# Patient Record
Sex: FEMALE | Race: WHITE | Employment: OTHER | URBAN - METROPOLITAN AREA
[De-identification: names, ages, dates, MRNs, and addresses within clinical notes are randomized per-mention and may not be internally consistent; named-entity substitution may affect disease eponyms.]

---

## 2017-07-19 ENCOUNTER — HOSPITAL ENCOUNTER (OUTPATIENT)
Dept: NON INVASIVE DIAGNOSTICS | Age: 82
Discharge: HOME | End: 2017-07-19
Attending: FAMILY MEDICINE | Admitting: FAMILY MEDICINE

## 2017-07-19 DIAGNOSIS — I35.0 NONRHEUMATIC AORTIC VALVE STENOSIS: ICD-10-CM

## 2017-07-19 LAB
LV EF: 55 %
LVEF MODALITY: NORMAL

## 2017-09-25 ENCOUNTER — OFFICE VISIT (OUTPATIENT)
Dept: ORTHOPEDIC SURGERY | Age: 82
End: 2017-09-25

## 2017-09-25 VITALS
HEIGHT: 60 IN | SYSTOLIC BLOOD PRESSURE: 144 MMHG | DIASTOLIC BLOOD PRESSURE: 77 MMHG | WEIGHT: 117 LBS | BODY MASS INDEX: 22.97 KG/M2

## 2017-09-25 DIAGNOSIS — G89.29 CHRONIC RIGHT-SIDED LOW BACK PAIN WITH RIGHT-SIDED SCIATICA: Primary | ICD-10-CM

## 2017-09-25 DIAGNOSIS — M54.41 CHRONIC RIGHT-SIDED LOW BACK PAIN WITH RIGHT-SIDED SCIATICA: Primary | ICD-10-CM

## 2017-09-25 PROCEDURE — E0135 WALKER FOLDING ADJUST/FIXED: HCPCS | Performed by: ORTHOPAEDIC SURGERY

## 2017-09-25 PROCEDURE — 1036F TOBACCO NON-USER: CPT | Performed by: ORTHOPAEDIC SURGERY

## 2017-09-25 PROCEDURE — 1090F PRES/ABSN URINE INCON ASSESS: CPT | Performed by: ORTHOPAEDIC SURGERY

## 2017-09-25 PROCEDURE — G8427 DOCREV CUR MEDS BY ELIG CLIN: HCPCS | Performed by: ORTHOPAEDIC SURGERY

## 2017-09-25 PROCEDURE — G8420 CALC BMI NORM PARAMETERS: HCPCS | Performed by: ORTHOPAEDIC SURGERY

## 2017-09-25 PROCEDURE — 4040F PNEUMOC VAC/ADMIN/RCVD: CPT | Performed by: ORTHOPAEDIC SURGERY

## 2017-09-25 PROCEDURE — 72100 X-RAY EXAM L-S SPINE 2/3 VWS: CPT | Performed by: ORTHOPAEDIC SURGERY

## 2017-09-25 PROCEDURE — 1123F ACP DISCUSS/DSCN MKR DOCD: CPT | Performed by: ORTHOPAEDIC SURGERY

## 2017-09-25 PROCEDURE — 99204 OFFICE O/P NEW MOD 45 MIN: CPT | Performed by: ORTHOPAEDIC SURGERY

## 2017-09-25 RX ORDER — PREDNISONE 20 MG/1
TABLET ORAL
COMMUNITY
Start: 2017-07-07 | End: 2018-06-05

## 2017-09-25 RX ORDER — ALBUTEROL SULFATE 90 MCG
HFA AEROSOL WITH ADAPTER (GRAM) INHALATION
COMMUNITY
Start: 2017-07-02 | End: 2018-06-05 | Stop reason: SDUPTHER

## 2017-09-25 RX ORDER — METHYLPREDNISOLONE 4 MG
TABLET, DOSE PACK ORAL
Qty: 1 KIT | Refills: 0 | Status: SHIPPED | OUTPATIENT
Start: 2017-09-25 | End: 2017-10-01

## 2017-10-03 ENCOUNTER — HOSPITAL ENCOUNTER (OUTPATIENT)
Dept: PHYSICAL THERAPY | Age: 82
Discharge: HOME | End: 2017-10-04
Admitting: ORTHOPAEDIC SURGERY

## 2017-10-03 ENCOUNTER — HOSPITAL ENCOUNTER (OUTPATIENT)
Dept: PHYSICAL THERAPY | Age: 82
Discharge: HOME | End: 2017-09-30
Admitting: ORTHOPAEDIC SURGERY

## 2017-10-10 ENCOUNTER — HOSPITAL ENCOUNTER (OUTPATIENT)
Dept: PHYSICAL THERAPY | Age: 82
Discharge: HOME | End: 2017-10-11
Admitting: ORTHOPAEDIC SURGERY

## 2017-10-17 ENCOUNTER — HOSPITAL ENCOUNTER (OUTPATIENT)
Dept: PHYSICAL THERAPY | Age: 82
Discharge: HOME | End: 2017-10-18
Admitting: ORTHOPAEDIC SURGERY

## 2017-10-24 ENCOUNTER — HOSPITAL ENCOUNTER (OUTPATIENT)
Dept: PHYSICAL THERAPY | Age: 82
Discharge: HOME | End: 2017-10-25
Admitting: ORTHOPAEDIC SURGERY

## 2017-11-01 ENCOUNTER — HOSPITAL ENCOUNTER (OUTPATIENT)
Dept: PHYSICAL THERAPY | Age: 82
Discharge: HOME | End: 2017-11-30
Attending: ORTHOPAEDIC SURGERY | Admitting: ORTHOPAEDIC SURGERY

## 2018-01-12 RX ORDER — METHYLPREDNISOLONE 4 MG/1
TABLET ORAL
Qty: 1 KIT | Refills: 0 | Status: SHIPPED | OUTPATIENT
Start: 2018-01-12 | End: 2018-06-05

## 2018-02-27 ENCOUNTER — TELEPHONE (OUTPATIENT)
Dept: ORTHOPEDIC SURGERY | Age: 83
End: 2018-02-27

## 2018-04-16 VITALS
TEMPERATURE: 97.6 F | BODY MASS INDEX: 21.14 KG/M2 | DIASTOLIC BLOOD PRESSURE: 70 MMHG | SYSTOLIC BLOOD PRESSURE: 108 MMHG | HEART RATE: 80 BPM | RESPIRATION RATE: 15 BRPM | HEIGHT: 61 IN | WEIGHT: 112 LBS

## 2018-05-22 ENCOUNTER — HOSPITAL ENCOUNTER (OUTPATIENT)
Dept: NON INVASIVE DIAGNOSTICS | Age: 83
Discharge: HOME | End: 2018-05-22
Attending: FAMILY MEDICINE | Admitting: FAMILY MEDICINE

## 2018-05-22 DIAGNOSIS — I50.1 LEFT VENTRICULAR FAILURE (HCC): ICD-10-CM

## 2018-05-22 LAB
LV EF: 58 %
LVEF MODALITY: NORMAL

## 2018-06-05 ENCOUNTER — OFFICE VISIT (OUTPATIENT)
Dept: PRIMARY CARE CLINIC | Age: 83
End: 2018-06-05

## 2018-06-05 VITALS
SYSTOLIC BLOOD PRESSURE: 122 MMHG | WEIGHT: 107 LBS | BODY MASS INDEX: 20.2 KG/M2 | HEART RATE: 80 BPM | RESPIRATION RATE: 16 BRPM | DIASTOLIC BLOOD PRESSURE: 74 MMHG | HEIGHT: 61 IN | TEMPERATURE: 97.6 F

## 2018-06-05 DIAGNOSIS — M16.0 OSTEOARTHRITIS OF BOTH HIPS, UNSPECIFIED OSTEOARTHRITIS TYPE: ICD-10-CM

## 2018-06-05 DIAGNOSIS — J45.20 MILD INTERMITTENT ASTHMA, UNSPECIFIED WHETHER COMPLICATED: Primary | ICD-10-CM

## 2018-06-05 DIAGNOSIS — I10 HYPERTENSION, UNSPECIFIED TYPE: ICD-10-CM

## 2018-06-05 PROCEDURE — G0444 DEPRESSION SCREEN ANNUAL: HCPCS | Performed by: FAMILY MEDICINE

## 2018-06-05 PROCEDURE — 99214 OFFICE O/P EST MOD 30 MIN: CPT | Performed by: FAMILY MEDICINE

## 2018-06-05 RX ORDER — LOSARTAN POTASSIUM AND HYDROCHLOROTHIAZIDE 12.5; 1 MG/1; MG/1
1 TABLET ORAL DAILY
Qty: 90 TABLET | Refills: 1 | Status: SHIPPED | OUTPATIENT
Start: 2018-06-05 | End: 2018-12-11 | Stop reason: SDUPTHER

## 2018-06-05 RX ORDER — PREDNISONE 20 MG/1
20 TABLET ORAL DAILY
Qty: 21 TABLET | Refills: 0 | Status: SHIPPED | OUTPATIENT
Start: 2018-06-05 | End: 2018-07-02

## 2018-06-05 ASSESSMENT — ENCOUNTER SYMPTOMS
ABDOMINAL DISTENTION: 0
VOMITING: 0
ABDOMINAL PAIN: 0
BACK PAIN: 0
APNEA: 0
TROUBLE SWALLOWING: 0
RESPIRATORY NEGATIVE: 1
COLOR CHANGE: 0
EYES NEGATIVE: 1
GASTROINTESTINAL NEGATIVE: 1
NAUSEA: 0
EYE DISCHARGE: 0
WHEEZING: 0
DIARRHEA: 0
COUGH: 0
SHORTNESS OF BREATH: 0
EYE PAIN: 0
SORE THROAT: 0
PHOTOPHOBIA: 0
SINUS PRESSURE: 0
EYE ITCHING: 0
CONSTIPATION: 0
CHEST TIGHTNESS: 0

## 2018-06-05 ASSESSMENT — PATIENT HEALTH QUESTIONNAIRE - PHQ9
SUM OF ALL RESPONSES TO PHQ QUESTIONS 1-9: 14
8. MOVING OR SPEAKING SO SLOWLY THAT OTHER PEOPLE COULD HAVE NOTICED. OR THE OPPOSITE, BEING SO FIGETY OR RESTLESS THAT YOU HAVE BEEN MOVING AROUND A LOT MORE THAN USUAL: 3
2. FEELING DOWN, DEPRESSED OR HOPELESS: 3
5. POOR APPETITE OR OVEREATING: 0
9. THOUGHTS THAT YOU WOULD BE BETTER OFF DEAD, OR OF HURTING YOURSELF: 0
1. LITTLE INTEREST OR PLEASURE IN DOING THINGS: 3
3. TROUBLE FALLING OR STAYING ASLEEP: 0
4. FEELING TIRED OR HAVING LITTLE ENERGY: 3
7. TROUBLE CONCENTRATING ON THINGS, SUCH AS READING THE NEWSPAPER OR WATCHING TELEVISION: 0
10. IF YOU CHECKED OFF ANY PROBLEMS, HOW DIFFICULT HAVE THESE PROBLEMS MADE IT FOR YOU TO DO YOUR WORK, TAKE CARE OF THINGS AT HOME, OR GET ALONG WITH OTHER PEOPLE: 0
SUM OF ALL RESPONSES TO PHQ9 QUESTIONS 1 & 2: 6
6. FEELING BAD ABOUT YOURSELF - OR THAT YOU ARE A FAILURE OR HAVE LET YOURSELF OR YOUR FAMILY DOWN: 2

## 2018-06-14 RX ORDER — TIOTROPIUM BROMIDE INHALATION SPRAY 1.56 UG/1
SPRAY, METERED RESPIRATORY (INHALATION)
Qty: 12 G | Refills: 1 | OUTPATIENT
Start: 2018-06-14

## 2018-06-21 ENCOUNTER — TELEPHONE (OUTPATIENT)
Dept: ADMINISTRATIVE | Age: 83
End: 2018-06-21

## 2018-06-25 ENCOUNTER — TELEPHONE (OUTPATIENT)
Dept: PRIMARY CARE CLINIC | Age: 83
End: 2018-06-25

## 2018-07-02 ENCOUNTER — OFFICE VISIT (OUTPATIENT)
Dept: PRIMARY CARE CLINIC | Age: 83
End: 2018-07-02

## 2018-07-02 VITALS
RESPIRATION RATE: 14 BRPM | TEMPERATURE: 98.2 F | HEART RATE: 84 BPM | HEIGHT: 61 IN | DIASTOLIC BLOOD PRESSURE: 60 MMHG | SYSTOLIC BLOOD PRESSURE: 120 MMHG

## 2018-07-02 DIAGNOSIS — K21.00 GASTROESOPHAGEAL REFLUX DISEASE WITH ESOPHAGITIS: Primary | ICD-10-CM

## 2018-07-02 DIAGNOSIS — R29.898 MUSCULAR DECONDITIONING: ICD-10-CM

## 2018-07-02 DIAGNOSIS — J45.20 MILD INTERMITTENT ASTHMA WITHOUT COMPLICATION: ICD-10-CM

## 2018-07-02 PROCEDURE — 1036F TOBACCO NON-USER: CPT | Performed by: NURSE PRACTITIONER

## 2018-07-02 PROCEDURE — 1090F PRES/ABSN URINE INCON ASSESS: CPT | Performed by: NURSE PRACTITIONER

## 2018-07-02 PROCEDURE — G8420 CALC BMI NORM PARAMETERS: HCPCS | Performed by: NURSE PRACTITIONER

## 2018-07-02 PROCEDURE — 99214 OFFICE O/P EST MOD 30 MIN: CPT | Performed by: NURSE PRACTITIONER

## 2018-07-02 PROCEDURE — 4040F PNEUMOC VAC/ADMIN/RCVD: CPT | Performed by: NURSE PRACTITIONER

## 2018-07-02 PROCEDURE — G8427 DOCREV CUR MEDS BY ELIG CLIN: HCPCS | Performed by: NURSE PRACTITIONER

## 2018-07-02 PROCEDURE — 1123F ACP DISCUSS/DSCN MKR DOCD: CPT | Performed by: NURSE PRACTITIONER

## 2018-07-02 RX ORDER — LOSARTAN POTASSIUM AND HYDROCHLOROTHIAZIDE 12.5; 1 MG/1; MG/1
1 TABLET ORAL
COMMUNITY
End: 2018-07-24

## 2018-07-02 RX ORDER — ACETAMINOPHEN 500 MG
500 TABLET ORAL
COMMUNITY
End: 2021-08-09 | Stop reason: CLARIF

## 2018-07-02 RX ORDER — OMEPRAZOLE 20 MG/1
20 CAPSULE, DELAYED RELEASE ORAL DAILY
Qty: 30 CAPSULE | Refills: 1 | Status: SHIPPED | OUTPATIENT
Start: 2018-07-02 | End: 2018-07-24

## 2018-07-02 RX ORDER — PREDNISONE 10 MG/1
10 TABLET ORAL DAILY
Qty: 30 TABLET | Refills: 0 | Status: SHIPPED | OUTPATIENT
Start: 2018-07-02 | End: 2018-08-01

## 2018-07-02 RX ORDER — ALBUTEROL SULFATE 2.5 MG/3ML
SOLUTION RESPIRATORY (INHALATION)
COMMUNITY
End: 2018-07-24

## 2018-07-02 ASSESSMENT — ENCOUNTER SYMPTOMS
SHORTNESS OF BREATH: 0
COUGH: 0
DIARRHEA: 0
NAUSEA: 0
ABDOMINAL PAIN: 1
CHEST TIGHTNESS: 0
TROUBLE SWALLOWING: 0

## 2018-07-02 NOTE — PROGRESS NOTES
by mouth daily 30 tablet 0    docusate sodium (COLACE) 100 MG capsule Take 1 capsule by mouth 2 times daily 30 capsule 1    polyethylene glycol (MIRALAX) powder Take 17 g by mouth daily for 7 days PRN constipation 1 Bottle 0    losartan-hydrochlorothiazide (HYZAAR) 100-12.5 MG per tablet Take 1 tablet by mouth daily 90 tablet 1    ADVAIR DISKUS 250-50 MCG/DOSE AEPB Inhale 1 puff into the lungs 2 times daily 3 Inhaler 1    Cyanocobalamin (VITAMIN B 12 PO) Take by mouth daily       Albuterol Sulfate (PROVENTIL HFA IN) Inhale into the lungs as needed      Multiple Minerals-Vitamins (CALCIUM & VIT D3 BONE HEALTH PO) Take by mouth daily       albuterol (PROVENTIL) (2.5 MG/3ML) 0.083% nebulizer solution Inhale into the lungs       No current facility-administered medications for this visit. Health Maintenance   Topic Date Due    DTaP/Tdap/Td vaccine (1 - Tdap) 01/27/1945    Shingles Vaccine (1 of 2 - 2 Dose Series) 01/27/1976    Flu vaccine (1) 09/01/2018    Potassium monitoring  06/25/2019    Creatinine monitoring  06/25/2019    Pneumococcal low/med risk  Completed      Social History     Social History    Marital status:       Spouse name: N/A    Number of children: N/A    Years of education: N/A     Social History Main Topics    Smoking status: Never Smoker    Smokeless tobacco: Never Used    Alcohol use No    Drug use: No    Sexual activity: Not Currently     Other Topics Concern    None     Social History Narrative    None     Family History   Problem Relation Age of Onset    Other Other         TB     Patient Active Problem List   Diagnosis    Fibrocystic breast    Fracture of 5th metatarsal base, right    Nausea & vomiting        LABS:  Admission on 06/25/2018, Discharged on 06/25/2018   Component Date Value Ref Range Status    WBC 06/25/2018 15.2* 4.0 - 11.0 K/uL Final    RBC 06/25/2018 4.67  4.00 - 5.20 M/uL Final    Hemoglobin 06/25/2018 14.8  12.0 - 16.0 g/dL Final    Hematocrit 06/25/2018 44.0  36.0 - 48.0 % Final    MCV 06/25/2018 94.2  80.0 - 100.0 fL Final    MCH 06/25/2018 31.6  26.0 - 34.0 pg Final    MCHC 06/25/2018 33.6  31.0 - 36.0 g/dL Final    RDW 06/25/2018 13.1  12.4 - 15.4 % Final    Platelets 12/24/4954 278  135 - 450 K/uL Final    MPV 06/25/2018 7.8  5.0 - 10.5 fL Final    Neutrophils % 06/25/2018 90.7  % Final    Lymphocytes % 06/25/2018 6.0  % Final    Monocytes % 06/25/2018 2.8  % Final    Eosinophils % 06/25/2018 0.1  % Final    Basophils % 06/25/2018 0.4  % Final    Neutrophils # 06/25/2018 13.8* 1.7 - 7.7 K/uL Final    Lymphocytes # 06/25/2018 0.9* 1.0 - 5.1 K/uL Final    Monocytes # 06/25/2018 0.4  0.0 - 1.3 K/uL Final    Eosinophils # 06/25/2018 0.0  0.0 - 0.6 K/uL Final    Basophils # 06/25/2018 0.1  0.0 - 0.2 K/uL Final    Protime 06/25/2018 11.2  9.8 - 13.0 sec Final    Comment: Effective 5-31-18 09:00am EST  Please note reference ranges have  changed for PT and INR Testing.  INR 06/25/2018 0.98  0.86 - 1.14 Final    Comment: Effective 02/15/2017 at 9am EST    Normal: 0.85 - 1.15  Therapeutic: 2.0 - 3.0  Pros. Valve: 2.5 - 3.5  AMI: 2.0 - 3.0      Sodium 06/25/2018 141  136 - 145 mmol/L Final    Potassium reflex Magnesium 06/25/2018 4.0  3.5 - 5.1 mmol/L Final    Comment: Specimen hemolysis has exceeded the interference as defined by Roche. Value may be falsely increased. Suggest recollection if clinically  indicated.  Chloride 06/25/2018 99  99 - 110 mmol/L Final    CO2 06/25/2018 26  21 - 32 mmol/L Final    Anion Gap 06/25/2018 16  3 - 16 Final    Glucose 06/25/2018 153* 70 - 99 mg/dL Final    BUN 06/25/2018 20  7 - 20 mg/dL Final    CREATININE 06/25/2018 0.5* 0.6 - 1.2 mg/dL Final    GFR Non- 06/25/2018 >60  >60 Final    Comment: >60 mL/min/1.73m2 EGFR, calc. for ages 25 and older using the  MDRD formula (not corrected for weight), is valid for stable  renal function.       GFR  7.480* 7.350 - 7.450 Final    pCO2, Eveline 06/25/2018 39.7* 40.0 - 50.0 mm Hg Final    pO2, Eveline 06/25/2018 55  Not Established mm Hg Final    HCO3, Venous 06/25/2018 29.5* 23.0 - 29.0 mmol/L Final    Base Excess, Eveline 06/25/2018 6* -3 - 3 Final    O2 Sat, Eveline 06/25/2018 90  Not Established % Final    TC02 (Calc), Eveline 06/25/2018 31  Not Established mmol/L Final    Lactate 06/25/2018 2.13* 0.40 - 2.00 mmol/L Final    Sample Type 06/25/2018 EVELINE   Final    Performed on 06/25/2018 SEE BELOW   Final    Performed on POC    POC Troponin I 06/25/2018 0.00  0.00 - 0.10 ng/mL Final    Comment: Point of Care result varies from laboratory result           and should not be compared.       Sample Type 06/25/2018 EVELINE   Final    Performed on 06/25/2018 SEE BELOW   Final    Performed on POC    BNP 06/25/2018 100.0* 0.0 - 99.9 pg/mL Final    Sample Type 06/25/2018 EVELINE   Final    Performed on 06/25/2018 SEE BELOW   Final    Performed on POC    Color, UA 06/25/2018 Not Entered  Straw/Yellow Final    Clarity, UA 06/25/2018 Not Entered  Clear Final    Performed on POC    Glucose, Ur 06/25/2018 Negative  Negative mg/dL Final    Bilirubin Urine 06/25/2018 Negative  Negative mg/dL Final    Ketones, Urine 06/25/2018 Negative  Negative mg/dL Final    Specific Gravity, UA 06/25/2018 1.015  1.005 - 1.030 Final    Blood, Urine 06/25/2018 Negative  Negative Final    pH, UA 06/25/2018 6.5  5.0 - 8.0 Final    Protein, UA 06/25/2018 Negative  Negative mg/dL Final    Urobilinogen, Urine 06/25/2018 0.2  <2.0 E.U./dL Final    Nitrite, Urine 06/25/2018 Negative  Negative Final    Leukocyte Esterase, Urine 06/25/2018 Negative  Negative Final    Microscopic Examination 06/25/2018 SEE BELOW   Final    Microscopic - Not Indicated          PHYSICAL EXAM  /60 (Site: Right Arm, Position: Sitting)   Pulse 84   Temp 98.2 °F (36.8 °C) (Oral)   Resp 14   Ht 5' 1\" (1.549 m)     BP Readings from Last 3 Encounters:   07/02/18 120/60 Bromide (INCRUSE ELLIPTA) 62.5 MCG/INH AEPB; Inhale 62.5 mcg into the lungs daily  -     predniSONE (DELTASONE) 10 MG tablet; Take 1 tablet by mouth daily  -     Proventil HFA 2 puffs Q4-6 hrs prn wheezing, SOB  -     Advair Diskus 250-50 mcg/dose 1 puff twice daily    Muscular deconditioning  -     Internal Referral to [facility] eval    Osteoarthritis of both hips        - Home PT eval         - Prednisone 10mg 1 tablet daily x 30 days     Return in about 4 weeks (around 7/30/2018), or if symptoms worsen or fail to improve. Reviewed and/or ordered clinical lab results No  Reviewed and/or ordered radiology tests Yes  Reviewed and/or ordered other diagnostic tests Yes    Alvin Petit was counseled regarding symptoms of current diagnosis, course and complications of disease if inadequately treated, side effects of medications, diagnosis, treatment options, and prognosis, risks, benefits, complications, and alternatives of treatment, labs, imaging and other studies and treatment targets and goals. She understands instructions and counseling. This dictation was performed with a verbal recognition program (DRAGON) and it was checked for errors. It is possible that there are still dictated errors within this office note. If so, please bring any errors to my attention for an addendum. All efforts were made to ensure that this office note is accurate.

## 2018-07-03 ENCOUNTER — TELEPHONE (OUTPATIENT)
Dept: PRIMARY CARE CLINIC | Age: 83
End: 2018-07-03

## 2018-07-24 ENCOUNTER — OFFICE VISIT (OUTPATIENT)
Dept: PRIMARY CARE CLINIC | Age: 83
End: 2018-07-24

## 2018-07-24 VITALS
WEIGHT: 109 LBS | HEIGHT: 62 IN | HEART RATE: 92 BPM | SYSTOLIC BLOOD PRESSURE: 102 MMHG | RESPIRATION RATE: 15 BRPM | DIASTOLIC BLOOD PRESSURE: 64 MMHG | BODY MASS INDEX: 20.06 KG/M2

## 2018-07-24 DIAGNOSIS — R10.84 GENERALIZED ABDOMINAL PAIN: Primary | ICD-10-CM

## 2018-07-24 DIAGNOSIS — J45.20 MILD INTERMITTENT ASTHMA, UNSPECIFIED WHETHER COMPLICATED: ICD-10-CM

## 2018-07-24 DIAGNOSIS — D72.829 LEUKOCYTOSIS, UNSPECIFIED TYPE: ICD-10-CM

## 2018-07-24 LAB
BASOPHILS ABSOLUTE: 0.1 K/UL (ref 0–0.2)
BASOPHILS RELATIVE PERCENT: 0.6 %
BILIRUBIN, POC: ABNORMAL
BLOOD URINE, POC: ABNORMAL
CLARITY, POC: ABNORMAL
COLOR, POC: YELLOW
EOSINOPHILS ABSOLUTE: 0 K/UL (ref 0–0.6)
EOSINOPHILS RELATIVE PERCENT: 0.1 %
GLUCOSE URINE, POC: ABNORMAL
HCT VFR BLD CALC: 43.3 % (ref 36–48)
HEMOGLOBIN: 14.4 G/DL (ref 12–16)
KETONES, POC: ABNORMAL
LEUKOCYTE EST, POC: ABNORMAL
LYMPHOCYTES ABSOLUTE: 1.3 K/UL (ref 1–5.1)
LYMPHOCYTES RELATIVE PERCENT: 13.6 %
MCH RBC QN AUTO: 31.3 PG (ref 26–34)
MCHC RBC AUTO-ENTMCNC: 33.1 G/DL (ref 31–36)
MCV RBC AUTO: 94.6 FL (ref 80–100)
MONOCYTES ABSOLUTE: 0.3 K/UL (ref 0–1.3)
MONOCYTES RELATIVE PERCENT: 2.7 %
NEUTROPHILS ABSOLUTE: 7.9 K/UL (ref 1.7–7.7)
NEUTROPHILS RELATIVE PERCENT: 83 %
NITRITE, POC: ABNORMAL
PDW BLD-RTO: 12.7 % (ref 12.4–15.4)
PH, POC: 7
PLATELET # BLD: 311 K/UL (ref 135–450)
PMV BLD AUTO: 8.2 FL (ref 5–10.5)
PROTEIN, POC: ABNORMAL
RBC # BLD: 4.58 M/UL (ref 4–5.2)
SPECIFIC GRAVITY, POC: 1.02
UROBILINOGEN, POC: 0.2
WBC # BLD: 9.5 K/UL (ref 4–11)

## 2018-07-24 PROCEDURE — 81002 URINALYSIS NONAUTO W/O SCOPE: CPT | Performed by: FAMILY MEDICINE

## 2018-07-24 PROCEDURE — 1101F PT FALLS ASSESS-DOCD LE1/YR: CPT | Performed by: FAMILY MEDICINE

## 2018-07-24 PROCEDURE — 1123F ACP DISCUSS/DSCN MKR DOCD: CPT | Performed by: FAMILY MEDICINE

## 2018-07-24 PROCEDURE — G8427 DOCREV CUR MEDS BY ELIG CLIN: HCPCS | Performed by: FAMILY MEDICINE

## 2018-07-24 PROCEDURE — 99214 OFFICE O/P EST MOD 30 MIN: CPT | Performed by: FAMILY MEDICINE

## 2018-07-24 PROCEDURE — 1090F PRES/ABSN URINE INCON ASSESS: CPT | Performed by: FAMILY MEDICINE

## 2018-07-24 PROCEDURE — G8420 CALC BMI NORM PARAMETERS: HCPCS | Performed by: FAMILY MEDICINE

## 2018-07-24 PROCEDURE — 1036F TOBACCO NON-USER: CPT | Performed by: FAMILY MEDICINE

## 2018-07-24 PROCEDURE — 4040F PNEUMOC VAC/ADMIN/RCVD: CPT | Performed by: FAMILY MEDICINE

## 2018-07-24 RX ORDER — DICYCLOMINE HCL 20 MG
20 TABLET ORAL EVERY 6 HOURS
Qty: 120 TABLET | Refills: 1 | Status: SHIPPED | OUTPATIENT
Start: 2018-07-24 | End: 2018-11-01 | Stop reason: SDUPTHER

## 2018-07-24 RX ORDER — DICYCLOMINE HCL 20 MG
20 TABLET ORAL EVERY 6 HOURS
Qty: 120 TABLET | Refills: 1 | Status: SHIPPED | OUTPATIENT
Start: 2018-07-24 | End: 2018-07-24 | Stop reason: SDUPTHER

## 2018-07-24 ASSESSMENT — ENCOUNTER SYMPTOMS
EYES NEGATIVE: 1
WHEEZING: 0
COUGH: 0
EYE PAIN: 0
SORE THROAT: 0
EYE ITCHING: 0
CHEST TIGHTNESS: 0
EYE DISCHARGE: 0
DIARRHEA: 0
GASTROINTESTINAL NEGATIVE: 1
PHOTOPHOBIA: 0
NAUSEA: 0
BACK PAIN: 0
TROUBLE SWALLOWING: 0
SINUS PRESSURE: 0
COLOR CHANGE: 0
ABDOMINAL DISTENTION: 0
APNEA: 0
ABDOMINAL PAIN: 0
SHORTNESS OF BREATH: 0
CONSTIPATION: 0
VOMITING: 0
RESPIRATORY NEGATIVE: 1

## 2018-07-24 NOTE — PROGRESS NOTES
headaches. Hematological: Negative. Negative for adenopathy. Psychiatric/Behavioral: Negative. Negative for agitation, behavioral problems and confusion. No Known Allergies  Prior to Visit Medications    Medication Sig Taking?  Authorizing Provider   ADVAIR DISKUS 250-50 MCG/DOSE AEPB Inhale 1 puff into the lungs 2 times daily Yes Eden Giron MD   dicyclomine (BENTYL) 20 MG tablet Take 1 tablet by mouth every 6 hours Yes Eden Giron MD   acetaminophen (TYLENOL) 500 MG tablet Take 500 mg by mouth Yes Historical Provider, MD   cyanocobalamin ([employer] VITAMIN B12) 1000 MCG tablet Take by mouth Yes Historical Provider, MD   Umeclidinium Bromide (INCRUSE ELLIPTA) 62.5 MCG/INH AEPB Inhale 62.5 mcg into the lungs daily Yes BOBBI De CNP   predniSONE (DELTASONE) 10 MG tablet Take 1 tablet by mouth daily Yes BOBBI De - CNP   docusate sodium (COLACE) 100 MG capsule Take 1 capsule by mouth 2 times daily Yes Fannie Nichols MD   losartan-hydrochlorothiazide (HYZAAR) 100-12.5 MG per tablet Take 1 tablet by mouth daily Yes Eden Giron MD   Albuterol Sulfate (PROVENTIL HFA IN) Inhale into the lungs as needed Yes Historical Provider, MD   Multiple Minerals-Vitamins (CALCIUM & VIT D3 BONE HEALTH PO) Take by mouth daily  Yes Historical Provider, MD     Current Outpatient Prescriptions   Medication Sig Dispense Refill    ADVAIR DISKUS 250-50 MCG/DOSE AEPB Inhale 1 puff into the lungs 2 times daily 3 Inhaler 1    dicyclomine (BENTYL) 20 MG tablet Take 1 tablet by mouth every 6 hours 120 tablet 1    acetaminophen (TYLENOL) 500 MG tablet Take 500 mg by mouth      cyanocobalamin ([employer] VITAMIN B12) 1000 MCG tablet Take by mouth      Umeclidinium Bromide (INCRUSE ELLIPTA) 62.5 MCG/INH AEPB Inhale 62.5 mcg into the lungs daily 3 each 0    predniSONE (DELTASONE) 10 MG tablet Take 1 tablet by mouth daily 30 tablet 0    docusate sodium (COLACE) 100 MG capsule Take 1 capsule by mouth 2 times

## 2018-08-06 ENCOUNTER — OFFICE VISIT (OUTPATIENT)
Dept: PRIMARY CARE CLINIC | Age: 83
End: 2018-08-06

## 2018-08-06 VITALS
WEIGHT: 110 LBS | DIASTOLIC BLOOD PRESSURE: 74 MMHG | RESPIRATION RATE: 12 BRPM | HEART RATE: 80 BPM | BODY MASS INDEX: 20.77 KG/M2 | SYSTOLIC BLOOD PRESSURE: 146 MMHG | HEIGHT: 61 IN

## 2018-08-06 DIAGNOSIS — J45.909 MILD ASTHMA, UNSPECIFIED WHETHER COMPLICATED, UNSPECIFIED WHETHER PERSISTENT: ICD-10-CM

## 2018-08-06 DIAGNOSIS — K29.70 GASTRITIS WITHOUT BLEEDING, UNSPECIFIED CHRONICITY, UNSPECIFIED GASTRITIS TYPE: Primary | ICD-10-CM

## 2018-08-06 PROCEDURE — G8427 DOCREV CUR MEDS BY ELIG CLIN: HCPCS | Performed by: FAMILY MEDICINE

## 2018-08-06 PROCEDURE — 1123F ACP DISCUSS/DSCN MKR DOCD: CPT | Performed by: FAMILY MEDICINE

## 2018-08-06 PROCEDURE — 1036F TOBACCO NON-USER: CPT | Performed by: FAMILY MEDICINE

## 2018-08-06 PROCEDURE — 1101F PT FALLS ASSESS-DOCD LE1/YR: CPT | Performed by: FAMILY MEDICINE

## 2018-08-06 PROCEDURE — 4040F PNEUMOC VAC/ADMIN/RCVD: CPT | Performed by: FAMILY MEDICINE

## 2018-08-06 PROCEDURE — 99213 OFFICE O/P EST LOW 20 MIN: CPT | Performed by: FAMILY MEDICINE

## 2018-08-06 PROCEDURE — G8420 CALC BMI NORM PARAMETERS: HCPCS | Performed by: FAMILY MEDICINE

## 2018-08-06 PROCEDURE — 1090F PRES/ABSN URINE INCON ASSESS: CPT | Performed by: FAMILY MEDICINE

## 2018-08-06 RX ORDER — PREDNISONE 1 MG/1
5 TABLET ORAL DAILY
Qty: 90 TABLET | Refills: 0 | Status: SHIPPED | OUTPATIENT
Start: 2018-08-06 | End: 2018-08-16

## 2018-08-06 ASSESSMENT — ENCOUNTER SYMPTOMS
EYE PAIN: 0
COUGH: 0
SORE THROAT: 0
NAUSEA: 0
SHORTNESS OF BREATH: 0
TROUBLE SWALLOWING: 0
EYE ITCHING: 0
RESPIRATORY NEGATIVE: 1
CONSTIPATION: 0
ABDOMINAL DISTENTION: 0
SINUS PRESSURE: 0
ABDOMINAL PAIN: 0
EYE DISCHARGE: 0
COLOR CHANGE: 0
VOMITING: 0
WHEEZING: 0
EYES NEGATIVE: 1
BACK PAIN: 0
GASTROINTESTINAL NEGATIVE: 1
PHOTOPHOBIA: 0
APNEA: 0
CHEST TIGHTNESS: 0
DIARRHEA: 0

## 2018-08-06 NOTE — PROGRESS NOTES
confusion. No Known Allergies  Prior to Visit Medications    Medication Sig Taking?  Authorizing Provider   predniSONE (DELTASONE) 5 MG tablet Take 1 tablet by mouth daily for 10 days Yes Brandon Figueredo MD   ADVAIR DISKUS 250-50 MCG/DOSE AEPB Inhale 1 puff into the lungs 2 times daily Yes Brandon Figueredo MD   dicyclomine (BENTYL) 20 MG tablet Take 1 tablet by mouth every 6 hours Yes Brandon Figueredo MD   acetaminophen (TYLENOL) 500 MG tablet Take 500 mg by mouth Yes Historical Provider, MD   cyanocobalamin ([employer] VITAMIN B12) 1000 MCG tablet Take by mouth Yes Historical Provider, MD   Umeclidinium Bromide (INCRUSE ELLIPTA) 62.5 MCG/INH AEPB Inhale 62.5 mcg into the lungs daily Yes BOBBI Dorado - CNP   docusate sodium (COLACE) 100 MG capsule Take 1 capsule by mouth 2 times daily Yes Javon Moreno MD   losartan-hydrochlorothiazide (HYZAAR) 100-12.5 MG per tablet Take 1 tablet by mouth daily Yes Brandon Figueredo MD   Albuterol Sulfate (PROVENTIL HFA IN) Inhale into the lungs as needed Yes Historical Provider, MD   Multiple Minerals-Vitamins (CALCIUM & VIT D3 BONE HEALTH PO) Take by mouth daily  Yes Historical Provider, MD     Current Outpatient Prescriptions   Medication Sig Dispense Refill    predniSONE (DELTASONE) 5 MG tablet Take 1 tablet by mouth daily for 10 days 90 tablet 0    ADVAIR DISKUS 250-50 MCG/DOSE AEPB Inhale 1 puff into the lungs 2 times daily 3 Inhaler 1    dicyclomine (BENTYL) 20 MG tablet Take 1 tablet by mouth every 6 hours 120 tablet 1    acetaminophen (TYLENOL) 500 MG tablet Take 500 mg by mouth      cyanocobalamin ([employer]S VITAMIN B12) 1000 MCG tablet Take by mouth      Umeclidinium Bromide (INCRUSE ELLIPTA) 62.5 MCG/INH AEPB Inhale 62.5 mcg into the lungs daily 3 each 0    docusate sodium (COLACE) 100 MG capsule Take 1 capsule by mouth 2 times daily 30 capsule 1    losartan-hydrochlorothiazide (HYZAAR) 100-12.5 MG per tablet Take 1 tablet by mouth daily 90 tablet 1    Basophils % 07/24/2018 0.6  % Final    Neutrophils # 07/24/2018 7.9* 1.7 - 7.7 K/uL Final    Lymphocytes # 07/24/2018 1.3  1.0 - 5.1 K/uL Final    Monocytes # 07/24/2018 0.3  0.0 - 1.3 K/uL Final    Eosinophils # 07/24/2018 0.0  0.0 - 0.6 K/uL Final    Basophils # 07/24/2018 0.1  0.0 - 0.2 K/uL Final          PHYSICAL EXAM  BP (!) 146/74 (Site: Left Arm, Position: Sitting)   Pulse 80   Resp 12   Ht 5' 1\" (1.549 m)   Wt 110 lb (49.9 kg)   BMI 20.78 kg/m²     BP Readings from Last 3 Encounters:   08/06/18 (!) 146/74   07/24/18 102/64   07/02/18 120/60       Wt Readings from Last 3 Encounters:   08/06/18 110 lb (49.9 kg)   07/24/18 109 lb (49.4 kg)   06/05/18 107 lb (48.5 kg)        Physical Exam   Constitutional: She is oriented to person, place, and time. She appears well-developed and well-nourished. No distress. HENT:   Head: Normocephalic and atraumatic. Right Ear: External ear normal.   Left Ear: External ear normal.   Nose: Nose normal.   Mouth/Throat: Oropharynx is clear and moist. No oropharyngeal exudate. Eyes: Conjunctivae and EOM are normal. Pupils are equal, round, and reactive to light. Right eye exhibits no discharge. Left eye exhibits no discharge. No scleral icterus. Neck: Normal range of motion. Neck supple. No JVD present. No tracheal deviation present. No thyromegaly present. Cardiovascular: Normal rate and regular rhythm. Exam reveals no gallop and no friction rub. No murmur heard. Pulmonary/Chest: Effort normal and breath sounds normal. No stridor. No respiratory distress. She has no wheezes. She has no rales. She exhibits no tenderness. Abdominal: Soft. Bowel sounds are normal. She exhibits no distension and no mass. There is no tenderness. There is no rebound and no guarding. Musculoskeletal: Normal range of motion. She exhibits no edema, tenderness or deformity. Lymphadenopathy:     She has no cervical adenopathy.    Neurological: She is alert and oriented to person, place, and time. She has normal reflexes. No cranial nerve deficit. She exhibits normal muscle tone. Coordination normal.   Skin: Skin is warm and dry. No rash noted. She is not diaphoretic. No erythema. No pallor. Psychiatric: She has a normal mood and affect. Her behavior is normal. Judgment and thought content normal.   Vitals reviewed. ASSESSMENT/PLAN:  Kayla Cannon was seen today for other. Diagnoses and all orders for this visit:    Gastritis without bleeding, unspecified chronicity, unspecified gastritis type  She can continue Bentyl 20 mg 4 times a day as needed. I'll follow up with her p.r.n. Did not give her a prescription today. She will call if she needs one. Mild asthma, unspecified whether complicated, unspecified whether persistent  -     predniSONE (DELTASONE) 5 MG tablet; Take 1 tablet by mouth daily for 10 days discussed side effects risks and benefits of being on long-term prednisone. She'll take 5 mg a day. Try to wean the dose down if possible. Follow up with her in 3 months. No Follow-up on file. Reviewed and/or ordered clinical lab results No  Reviewed and/or ordered radiology tests No  Reviewed and/or ordered other diagnostic tests No  Discussed test results with performing physician No  Independently reviewed image, tracing, or specimen No  Made a decision to obtain old records No  Reviewed and summarized old records Marisela Polk December was counseled regarding symptoms of current diagnosis, course and complications of disease if inadequately treated, side effects of medications, diagnosis, treatment options, and prognosis, risks, benefits, complications, and alternatives of treatment, labs, imaging and other studies and treatment targets and goals. She understands instructions and counseling. This dictation was performed with a verbal recognition program (DRAGON) and it was checked for errors.  It is possible that there are still dictated errors within this office note. If so, please bring any errors to my attention for an addendum. All efforts were made to ensure that this office note is accurate.

## 2018-09-23 DIAGNOSIS — J45.20 MILD INTERMITTENT ASTHMA WITHOUT COMPLICATION: ICD-10-CM

## 2018-09-24 RX ORDER — UMECLIDINIUM 62.5 UG/1
AEROSOL, POWDER ORAL
Qty: 90 EACH | Refills: 0 | Status: SHIPPED | OUTPATIENT
Start: 2018-09-24 | End: 2018-12-27 | Stop reason: SDUPTHER

## 2018-11-01 DIAGNOSIS — D72.829 LEUKOCYTOSIS, UNSPECIFIED TYPE: ICD-10-CM

## 2018-11-01 DIAGNOSIS — R10.84 GENERALIZED ABDOMINAL PAIN: ICD-10-CM

## 2018-11-01 RX ORDER — DICYCLOMINE HCL 20 MG
20 TABLET ORAL EVERY 6 HOURS
Qty: 120 TABLET | Refills: 0 | Status: SHIPPED | OUTPATIENT
Start: 2018-11-01 | End: 2018-12-11 | Stop reason: SDUPTHER

## 2018-11-23 DIAGNOSIS — I10 HYPERTENSION, UNSPECIFIED TYPE: ICD-10-CM

## 2018-11-26 RX ORDER — LOSARTAN POTASSIUM AND HYDROCHLOROTHIAZIDE 12.5; 1 MG/1; MG/1
TABLET ORAL
Qty: 90 TABLET | Refills: 1 | OUTPATIENT
Start: 2018-11-26

## 2018-12-11 ENCOUNTER — OFFICE VISIT (OUTPATIENT)
Dept: PRIMARY CARE CLINIC | Age: 83
End: 2018-12-11
Payer: MEDICARE

## 2018-12-11 VITALS
WEIGHT: 116 LBS | HEIGHT: 61 IN | RESPIRATION RATE: 14 BRPM | SYSTOLIC BLOOD PRESSURE: 124 MMHG | DIASTOLIC BLOOD PRESSURE: 68 MMHG | BODY MASS INDEX: 21.9 KG/M2

## 2018-12-11 DIAGNOSIS — I10 HYPERTENSION, UNSPECIFIED TYPE: ICD-10-CM

## 2018-12-11 DIAGNOSIS — Z23 NEED FOR IMMUNIZATION AGAINST INFLUENZA: Primary | ICD-10-CM

## 2018-12-11 DIAGNOSIS — R10.84 GENERALIZED ABDOMINAL PAIN: ICD-10-CM

## 2018-12-11 PROCEDURE — G0008 ADMIN INFLUENZA VIRUS VAC: HCPCS | Performed by: FAMILY MEDICINE

## 2018-12-11 PROCEDURE — 1090F PRES/ABSN URINE INCON ASSESS: CPT | Performed by: FAMILY MEDICINE

## 2018-12-11 PROCEDURE — 1101F PT FALLS ASSESS-DOCD LE1/YR: CPT | Performed by: FAMILY MEDICINE

## 2018-12-11 PROCEDURE — 1036F TOBACCO NON-USER: CPT | Performed by: FAMILY MEDICINE

## 2018-12-11 PROCEDURE — 4040F PNEUMOC VAC/ADMIN/RCVD: CPT | Performed by: FAMILY MEDICINE

## 2018-12-11 PROCEDURE — 99213 OFFICE O/P EST LOW 20 MIN: CPT | Performed by: FAMILY MEDICINE

## 2018-12-11 PROCEDURE — G8420 CALC BMI NORM PARAMETERS: HCPCS | Performed by: FAMILY MEDICINE

## 2018-12-11 PROCEDURE — G8427 DOCREV CUR MEDS BY ELIG CLIN: HCPCS | Performed by: FAMILY MEDICINE

## 2018-12-11 PROCEDURE — G8482 FLU IMMUNIZE ORDER/ADMIN: HCPCS | Performed by: FAMILY MEDICINE

## 2018-12-11 PROCEDURE — 90662 IIV NO PRSV INCREASED AG IM: CPT | Performed by: FAMILY MEDICINE

## 2018-12-11 PROCEDURE — 1123F ACP DISCUSS/DSCN MKR DOCD: CPT | Performed by: FAMILY MEDICINE

## 2018-12-11 RX ORDER — LOSARTAN POTASSIUM AND HYDROCHLOROTHIAZIDE 12.5; 1 MG/1; MG/1
1 TABLET ORAL DAILY
Qty: 90 TABLET | Refills: 1 | Status: SHIPPED | OUTPATIENT
Start: 2018-12-11 | End: 2019-06-11 | Stop reason: SDUPTHER

## 2018-12-11 RX ORDER — DICYCLOMINE HCL 20 MG
20 TABLET ORAL EVERY 6 HOURS
Qty: 120 TABLET | Refills: 1 | Status: SHIPPED | OUTPATIENT
Start: 2018-12-11 | End: 2021-08-09 | Stop reason: CLARIF

## 2018-12-11 ASSESSMENT — ENCOUNTER SYMPTOMS
EYES NEGATIVE: 1
TROUBLE SWALLOWING: 0
RESPIRATORY NEGATIVE: 1
WHEEZING: 0
GASTROINTESTINAL NEGATIVE: 1
CHEST TIGHTNESS: 0
EYE PAIN: 0
ABDOMINAL DISTENTION: 0
COLOR CHANGE: 0
NAUSEA: 0
BACK PAIN: 0
COUGH: 0
ABDOMINAL PAIN: 0
CONSTIPATION: 0
PHOTOPHOBIA: 0
DIARRHEA: 0
VOMITING: 0
SORE THROAT: 0
EYE DISCHARGE: 0
APNEA: 0
EYE ITCHING: 0
SINUS PRESSURE: 0
SHORTNESS OF BREATH: 0

## 2018-12-11 NOTE — PROGRESS NOTES
Patient Responses:    Have you ever had a reaction to a flu vaccine? No  Are you able to eat eggs without adverse effects? Yes  Do you have any current illness? No  Have you ever had Guillian [place]le[place] Syndrome? No    Immunization(s) given during visit:    Immunizations     Name Date Dose Route    Influenza, High Dose (Fluzone 65 yrs and older) 12/11/2018 0.5 mL Intramuscular    Site: Deltoid- Right    Lot: [id number]    NDC: 99363-941-99           Vaccine Information Sheet, \"Influenza - Inactivated\"  given to Arnaldo Areas, or parent/legal guardian of  [facility] and verbalized understanding. Flu vaccine given per order. Please see immunization tab. Antonio Taylor  instructed to remain in clinic for 20 minutes afterwards and report any adverse reaction to me immediately.
ELLIPTA 62.5 MCG/INH AEPB USE 1 INHALATION DAILY Yes Lupe Hatch MD   ADVAIR DISKUS 250-50 MCG/DOSE AEPB Inhale 1 puff into the lungs 2 times daily Yes Lupe Hatch MD   acetaminophen (TYLENOL) 500 MG tablet Take 500 mg by mouth Yes Historical Provider, MD   cyanocobalamin ([employer] VITAMIN B12) 1000 MCG tablet Take by mouth Yes Historical Provider, MD   losartan-hydrochlorothiazide (HYZAAR) 100-12.5 MG per tablet Take 1 tablet by mouth daily Yes Lupe Hatch MD   Albuterol Sulfate (PROVENTIL HFA IN) Inhale into the lungs as needed Yes Historical Provider, MD   carbidopa-levodopa (SINEMET)  MG per tablet Take 1 tablet by mouth 3 times daily  Historical Provider, MD     Current Outpatient Prescriptions   Medication Sig Dispense Refill    Oral Electrolytes (SUSTAIN PO) Take by mouth      dicyclomine (BENTYL) 20 MG tablet Take 1 tablet by mouth every 6 hours 120 tablet 0    INCRUSE ELLIPTA 62.5 MCG/INH AEPB USE 1 INHALATION DAILY 90 each 0    ADVAIR DISKUS 250-50 MCG/DOSE AEPB Inhale 1 puff into the lungs 2 times daily 3 Inhaler 1    acetaminophen (TYLENOL) 500 MG tablet Take 500 mg by mouth      cyanocobalamin ([employer]S VITAMIN B12) 1000 MCG tablet Take by mouth      losartan-hydrochlorothiazide (HYZAAR) 100-12.5 MG per tablet Take 1 tablet by mouth daily 90 tablet 1    Albuterol Sulfate (PROVENTIL HFA IN) Inhale into the lungs as needed      carbidopa-levodopa (SINEMET)  MG per tablet Take 1 tablet by mouth 3 times daily  3     No current facility-administered medications for this visit. Health Maintenance   Topic Date Due    DTaP/Tdap/Td vaccine (1 - Tdap) [birth year]/1945    Shingles Vaccine (1 of 2 - 2 Dose Series) 01/27/1976    Flu vaccine (1) 09/01/2018    Potassium monitoring  06/25/2019    Creatinine monitoring  06/25/2019    Pneumococcal low/med risk  Completed      Social History     Social History    Marital status:       Spouse name: N/A    Number of children: N/A

## 2018-12-20 ENCOUNTER — TELEPHONE (OUTPATIENT)
Dept: PRIMARY CARE CLINIC | Age: 83
End: 2018-12-20

## 2018-12-27 ENCOUNTER — TELEPHONE (OUTPATIENT)
Dept: PRIMARY CARE CLINIC | Age: 83
End: 2018-12-27

## 2018-12-27 NOTE — TELEPHONE ENCOUNTER
Patients knee has been swollen and very painful to be on. Seems to be getting worse. Does she need to be seen or should she go get xray?     [facility] [phone]

## 2018-12-29 ENCOUNTER — APPOINTMENT (OUTPATIENT)
Dept: GENERAL RADIOLOGY | Age: 83
End: 2018-12-29
Payer: MEDICARE

## 2018-12-29 ENCOUNTER — HOSPITAL ENCOUNTER (EMERGENCY)
Age: 83
Discharge: HOME | End: 2018-12-29
Attending: EMERGENCY MEDICINE
Payer: MEDICARE

## 2018-12-29 VITALS
DIASTOLIC BLOOD PRESSURE: 59 MMHG | OXYGEN SATURATION: 97 % | TEMPERATURE: 99 F | HEART RATE: 84 BPM | RESPIRATION RATE: 18 BRPM | SYSTOLIC BLOOD PRESSURE: 145 MMHG

## 2018-12-29 DIAGNOSIS — M79.605 LEFT LEG PAIN: Primary | ICD-10-CM

## 2018-12-29 LAB
ANION GAP SERPL CALCULATED.3IONS-SCNC: 14 MMOL/L (ref 3–16)
BUN BLDV-MCNC: 13 MG/DL (ref 7–20)
CALCIUM SERPL-MCNC: 9.5 MG/DL (ref 8.3–10.6)
CHLORIDE BLD-SCNC: 100 MMOL/L (ref 99–110)
CO2: 26 MMOL/L (ref 21–32)
CREAT SERPL-MCNC: <0.5 MG/DL (ref 0.6–1.2)
D DIMER: <200 NG/ML DDU (ref 0–229)
GFR AFRICAN AMERICAN: >60
GFR NON-AFRICAN AMERICAN: >60
GLUCOSE BLD-MCNC: 132 MG/DL (ref 70–99)
POTASSIUM REFLEX MAGNESIUM: 3.6 MMOL/L (ref 3.5–5.1)
SODIUM BLD-SCNC: 140 MMOL/L (ref 136–145)

## 2018-12-29 PROCEDURE — 99283 EMERGENCY DEPT VISIT LOW MDM: CPT

## 2018-12-29 PROCEDURE — 85379 FIBRIN DEGRADATION QUANT: CPT

## 2018-12-29 PROCEDURE — 36415 COLL VENOUS BLD VENIPUNCTURE: CPT

## 2018-12-29 PROCEDURE — 73560 X-RAY EXAM OF KNEE 1 OR 2: CPT

## 2018-12-29 PROCEDURE — 80048 BASIC METABOLIC PNL TOTAL CA: CPT

## 2018-12-29 RX ORDER — PREDNISONE 1 MG/1
2.5 TABLET ORAL DAILY
COMMUNITY
End: 2019-02-05

## 2018-12-29 ASSESSMENT — ENCOUNTER SYMPTOMS
ABDOMINAL DISTENTION: 0
ABDOMINAL PAIN: 0
SHORTNESS OF BREATH: 0
EYES NEGATIVE: 1
COLOR CHANGE: 0

## 2018-12-31 ENCOUNTER — OFFICE VISIT (OUTPATIENT)
Dept: PRIMARY CARE CLINIC | Age: 83
End: 2018-12-31
Payer: MEDICARE

## 2018-12-31 ENCOUNTER — TELEPHONE (OUTPATIENT)
Dept: PRIMARY CARE CLINIC | Age: 83
End: 2018-12-31

## 2018-12-31 VITALS
HEART RATE: 75 BPM | BODY MASS INDEX: 22.67 KG/M2 | WEIGHT: 120 LBS | DIASTOLIC BLOOD PRESSURE: 62 MMHG | OXYGEN SATURATION: 95 % | TEMPERATURE: 98 F | SYSTOLIC BLOOD PRESSURE: 112 MMHG

## 2018-12-31 DIAGNOSIS — M25.562 ACUTE PAIN OF LEFT KNEE: Primary | ICD-10-CM

## 2018-12-31 DIAGNOSIS — M25.562 ACUTE PAIN OF LEFT KNEE: ICD-10-CM

## 2018-12-31 DIAGNOSIS — J45.20 MILD INTERMITTENT ASTHMA WITHOUT COMPLICATION: ICD-10-CM

## 2018-12-31 DIAGNOSIS — R60.0 LOCALIZED EDEMA: ICD-10-CM

## 2018-12-31 LAB — URIC ACID, SERUM: 3.3 MG/DL (ref 2.6–6)

## 2018-12-31 PROCEDURE — G8420 CALC BMI NORM PARAMETERS: HCPCS | Performed by: NURSE PRACTITIONER

## 2018-12-31 PROCEDURE — 1123F ACP DISCUSS/DSCN MKR DOCD: CPT | Performed by: NURSE PRACTITIONER

## 2018-12-31 PROCEDURE — G8427 DOCREV CUR MEDS BY ELIG CLIN: HCPCS | Performed by: NURSE PRACTITIONER

## 2018-12-31 PROCEDURE — 1090F PRES/ABSN URINE INCON ASSESS: CPT | Performed by: NURSE PRACTITIONER

## 2018-12-31 PROCEDURE — 1036F TOBACCO NON-USER: CPT | Performed by: NURSE PRACTITIONER

## 2018-12-31 PROCEDURE — 99213 OFFICE O/P EST LOW 20 MIN: CPT | Performed by: NURSE PRACTITIONER

## 2018-12-31 PROCEDURE — G8482 FLU IMMUNIZE ORDER/ADMIN: HCPCS | Performed by: NURSE PRACTITIONER

## 2018-12-31 PROCEDURE — 4040F PNEUMOC VAC/ADMIN/RCVD: CPT | Performed by: NURSE PRACTITIONER

## 2018-12-31 PROCEDURE — 1101F PT FALLS ASSESS-DOCD LE1/YR: CPT | Performed by: NURSE PRACTITIONER

## 2019-01-03 ENCOUNTER — TELEPHONE (OUTPATIENT)
Dept: PRIMARY CARE CLINIC | Age: 84
End: 2019-01-03

## 2019-01-03 DIAGNOSIS — M25.562 ACUTE PAIN OF LEFT KNEE: Primary | ICD-10-CM

## 2019-01-03 RX ORDER — TRAMADOL HYDROCHLORIDE 50 MG/1
50 TABLET ORAL EVERY 6 HOURS PRN
Qty: 28 TABLET | Refills: 0 | Status: SHIPPED | OUTPATIENT
Start: 2019-01-03 | End: 2019-01-10

## 2019-01-04 ENCOUNTER — HOSPITAL ENCOUNTER (OUTPATIENT)
Dept: VASCULAR LAB | Age: 84
Discharge: HOME | End: 2019-01-04
Payer: MEDICARE

## 2019-01-04 DIAGNOSIS — R60.0 LOCALIZED EDEMA: ICD-10-CM

## 2019-01-04 DIAGNOSIS — M25.562 ACUTE PAIN OF LEFT KNEE: ICD-10-CM

## 2019-01-04 PROCEDURE — 93971 EXTREMITY STUDY: CPT

## 2019-01-07 ENCOUNTER — OFFICE VISIT (OUTPATIENT)
Dept: ORTHOPEDIC SURGERY | Age: 84
End: 2019-01-07
Payer: MEDICARE

## 2019-01-07 VITALS
SYSTOLIC BLOOD PRESSURE: 132 MMHG | WEIGHT: 119.93 LBS | DIASTOLIC BLOOD PRESSURE: 69 MMHG | HEIGHT: 61 IN | BODY MASS INDEX: 22.64 KG/M2

## 2019-01-07 DIAGNOSIS — M17.12 PRIMARY OSTEOARTHRITIS OF LEFT KNEE: ICD-10-CM

## 2019-01-07 DIAGNOSIS — M25.562 ACUTE PAIN OF LEFT KNEE: Primary | ICD-10-CM

## 2019-01-07 PROCEDURE — 20610 DRAIN/INJ JOINT/BURSA W/O US: CPT | Performed by: ORTHOPAEDIC SURGERY

## 2019-01-07 PROCEDURE — 4040F PNEUMOC VAC/ADMIN/RCVD: CPT | Performed by: ORTHOPAEDIC SURGERY

## 2019-01-07 PROCEDURE — G8427 DOCREV CUR MEDS BY ELIG CLIN: HCPCS | Performed by: ORTHOPAEDIC SURGERY

## 2019-01-07 PROCEDURE — G8420 CALC BMI NORM PARAMETERS: HCPCS | Performed by: ORTHOPAEDIC SURGERY

## 2019-01-07 PROCEDURE — G8482 FLU IMMUNIZE ORDER/ADMIN: HCPCS | Performed by: ORTHOPAEDIC SURGERY

## 2019-01-07 PROCEDURE — 1123F ACP DISCUSS/DSCN MKR DOCD: CPT | Performed by: ORTHOPAEDIC SURGERY

## 2019-01-07 PROCEDURE — 1090F PRES/ABSN URINE INCON ASSESS: CPT | Performed by: ORTHOPAEDIC SURGERY

## 2019-01-07 PROCEDURE — 99214 OFFICE O/P EST MOD 30 MIN: CPT | Performed by: ORTHOPAEDIC SURGERY

## 2019-01-07 PROCEDURE — 1036F TOBACCO NON-USER: CPT | Performed by: ORTHOPAEDIC SURGERY

## 2019-01-07 PROCEDURE — 1101F PT FALLS ASSESS-DOCD LE1/YR: CPT | Performed by: ORTHOPAEDIC SURGERY

## 2019-01-09 DIAGNOSIS — J45.20 MILD INTERMITTENT ASTHMA WITHOUT COMPLICATION: ICD-10-CM

## 2019-01-09 RX ORDER — UMECLIDINIUM 62.5 UG/1
AEROSOL, POWDER ORAL
Qty: 30 EACH | Refills: 0 | Status: SHIPPED | OUTPATIENT
Start: 2019-01-09 | End: 2019-02-12 | Stop reason: SDUPTHER

## 2019-01-20 DIAGNOSIS — J45.20 MILD INTERMITTENT ASTHMA, UNSPECIFIED WHETHER COMPLICATED: ICD-10-CM

## 2019-01-28 ENCOUNTER — OFFICE VISIT (OUTPATIENT)
Dept: ORTHOPEDIC SURGERY | Age: 84
End: 2019-01-28
Payer: MEDICARE

## 2019-01-28 VITALS — WEIGHT: 119.93 LBS | BODY MASS INDEX: 22.64 KG/M2 | HEIGHT: 61 IN

## 2019-01-28 DIAGNOSIS — M17.12 PRIMARY OSTEOARTHRITIS OF LEFT KNEE: ICD-10-CM

## 2019-01-28 DIAGNOSIS — M25.562 ACUTE PAIN OF LEFT KNEE: Primary | ICD-10-CM

## 2019-01-28 PROCEDURE — G8420 CALC BMI NORM PARAMETERS: HCPCS | Performed by: ORTHOPAEDIC SURGERY

## 2019-01-28 PROCEDURE — 1036F TOBACCO NON-USER: CPT | Performed by: ORTHOPAEDIC SURGERY

## 2019-01-28 PROCEDURE — 99214 OFFICE O/P EST MOD 30 MIN: CPT | Performed by: ORTHOPAEDIC SURGERY

## 2019-01-28 PROCEDURE — 1090F PRES/ABSN URINE INCON ASSESS: CPT | Performed by: ORTHOPAEDIC SURGERY

## 2019-01-28 PROCEDURE — G8427 DOCREV CUR MEDS BY ELIG CLIN: HCPCS | Performed by: ORTHOPAEDIC SURGERY

## 2019-01-28 PROCEDURE — 4040F PNEUMOC VAC/ADMIN/RCVD: CPT | Performed by: ORTHOPAEDIC SURGERY

## 2019-01-28 PROCEDURE — 1101F PT FALLS ASSESS-DOCD LE1/YR: CPT | Performed by: ORTHOPAEDIC SURGERY

## 2019-01-28 PROCEDURE — G8482 FLU IMMUNIZE ORDER/ADMIN: HCPCS | Performed by: ORTHOPAEDIC SURGERY

## 2019-01-28 PROCEDURE — 1123F ACP DISCUSS/DSCN MKR DOCD: CPT | Performed by: ORTHOPAEDIC SURGERY

## 2019-02-05 ENCOUNTER — OFFICE VISIT (OUTPATIENT)
Dept: PRIMARY CARE CLINIC | Age: 84
End: 2019-02-05
Payer: MEDICARE

## 2019-02-05 VITALS
DIASTOLIC BLOOD PRESSURE: 66 MMHG | HEIGHT: 61 IN | HEART RATE: 72 BPM | OXYGEN SATURATION: 96 % | SYSTOLIC BLOOD PRESSURE: 118 MMHG | RESPIRATION RATE: 15 BRPM | BODY MASS INDEX: 21.9 KG/M2 | WEIGHT: 116 LBS

## 2019-02-05 DIAGNOSIS — J45.30 MILD PERSISTENT ASTHMA, UNSPECIFIED WHETHER COMPLICATED: Primary | ICD-10-CM

## 2019-02-05 DIAGNOSIS — J45.30 MILD PERSISTENT ASTHMA, UNSPECIFIED WHETHER COMPLICATED: ICD-10-CM

## 2019-02-05 LAB
BASOPHILS ABSOLUTE: 0.1 K/UL (ref 0–0.2)
BASOPHILS RELATIVE PERCENT: 0.7 %
EOSINOPHILS ABSOLUTE: 0.3 K/UL (ref 0–0.6)
EOSINOPHILS RELATIVE PERCENT: 3.2 %
HCT VFR BLD CALC: 42.4 % (ref 36–48)
HEMOGLOBIN: 14 G/DL (ref 12–16)
LYMPHOCYTES ABSOLUTE: 2.1 K/UL (ref 1–5.1)
LYMPHOCYTES RELATIVE PERCENT: 24.6 %
MCH RBC QN AUTO: 32.2 PG (ref 26–34)
MCHC RBC AUTO-ENTMCNC: 33.1 G/DL (ref 31–36)
MCV RBC AUTO: 97.4 FL (ref 80–100)
MONOCYTES ABSOLUTE: 0.7 K/UL (ref 0–1.3)
MONOCYTES RELATIVE PERCENT: 7.8 %
NEUTROPHILS ABSOLUTE: 5.5 K/UL (ref 1.7–7.7)
NEUTROPHILS RELATIVE PERCENT: 63.7 %
PDW BLD-RTO: 12.2 % (ref 12.4–15.4)
PLATELET # BLD: 293 K/UL (ref 135–450)
PMV BLD AUTO: 7.7 FL (ref 5–10.5)
RBC # BLD: 4.35 M/UL (ref 4–5.2)
WBC # BLD: 8.7 K/UL (ref 4–11)

## 2019-02-05 PROCEDURE — 99213 OFFICE O/P EST LOW 20 MIN: CPT | Performed by: FAMILY MEDICINE

## 2019-02-05 PROCEDURE — G8482 FLU IMMUNIZE ORDER/ADMIN: HCPCS | Performed by: FAMILY MEDICINE

## 2019-02-05 PROCEDURE — 1090F PRES/ABSN URINE INCON ASSESS: CPT | Performed by: FAMILY MEDICINE

## 2019-02-05 PROCEDURE — G8420 CALC BMI NORM PARAMETERS: HCPCS | Performed by: FAMILY MEDICINE

## 2019-02-05 PROCEDURE — 1123F ACP DISCUSS/DSCN MKR DOCD: CPT | Performed by: FAMILY MEDICINE

## 2019-02-05 PROCEDURE — 4040F PNEUMOC VAC/ADMIN/RCVD: CPT | Performed by: FAMILY MEDICINE

## 2019-02-05 PROCEDURE — 1036F TOBACCO NON-USER: CPT | Performed by: FAMILY MEDICINE

## 2019-02-05 PROCEDURE — G8427 DOCREV CUR MEDS BY ELIG CLIN: HCPCS | Performed by: FAMILY MEDICINE

## 2019-02-05 PROCEDURE — 1101F PT FALLS ASSESS-DOCD LE1/YR: CPT | Performed by: FAMILY MEDICINE

## 2019-02-05 RX ORDER — PREDNISONE 1 MG/1
5 TABLET ORAL DAILY
Qty: 90 TABLET | Refills: 1 | Status: SHIPPED | OUTPATIENT
Start: 2019-02-05 | End: 2019-05-06

## 2019-02-05 ASSESSMENT — ENCOUNTER SYMPTOMS
NAUSEA: 0
WHEEZING: 0
TROUBLE SWALLOWING: 0
ABDOMINAL PAIN: 0
EYE PAIN: 0
COUGH: 0
GASTROINTESTINAL NEGATIVE: 1
RESPIRATORY NEGATIVE: 1
VOMITING: 0
CHEST TIGHTNESS: 0
SHORTNESS OF BREATH: 0
APNEA: 0
ABDOMINAL DISTENTION: 0
COLOR CHANGE: 0
BACK PAIN: 0
SORE THROAT: 0
EYE ITCHING: 0
PHOTOPHOBIA: 0
EYES NEGATIVE: 1
EYE DISCHARGE: 0
DIARRHEA: 0
CONSTIPATION: 0
SINUS PRESSURE: 0

## 2019-02-12 DIAGNOSIS — J45.20 MILD INTERMITTENT ASTHMA WITHOUT COMPLICATION: ICD-10-CM

## 2019-02-12 RX ORDER — UMECLIDINIUM 62.5 UG/1
AEROSOL, POWDER ORAL
Qty: 30 EACH | Refills: 0 | Status: SHIPPED | OUTPATIENT
Start: 2019-02-12 | End: 2021-08-09 | Stop reason: CLARIF

## 2019-03-01 ENCOUNTER — OFFICE VISIT (OUTPATIENT)
Dept: PRIMARY CARE CLINIC | Age: 84
End: 2019-03-01
Payer: MEDICARE

## 2019-03-01 VITALS
DIASTOLIC BLOOD PRESSURE: 60 MMHG | SYSTOLIC BLOOD PRESSURE: 114 MMHG | TEMPERATURE: 97.5 F | WEIGHT: 116 LBS | BODY MASS INDEX: 21.92 KG/M2

## 2019-03-01 DIAGNOSIS — M17.12 PRIMARY OSTEOARTHRITIS OF LEFT KNEE: Primary | ICD-10-CM

## 2019-03-01 PROCEDURE — 99213 OFFICE O/P EST LOW 20 MIN: CPT | Performed by: NURSE PRACTITIONER

## 2019-03-01 PROCEDURE — 1101F PT FALLS ASSESS-DOCD LE1/YR: CPT | Performed by: NURSE PRACTITIONER

## 2019-03-01 PROCEDURE — 1123F ACP DISCUSS/DSCN MKR DOCD: CPT | Performed by: NURSE PRACTITIONER

## 2019-03-01 PROCEDURE — G8420 CALC BMI NORM PARAMETERS: HCPCS | Performed by: NURSE PRACTITIONER

## 2019-03-01 PROCEDURE — 1090F PRES/ABSN URINE INCON ASSESS: CPT | Performed by: NURSE PRACTITIONER

## 2019-03-01 PROCEDURE — 4040F PNEUMOC VAC/ADMIN/RCVD: CPT | Performed by: NURSE PRACTITIONER

## 2019-03-01 PROCEDURE — G8427 DOCREV CUR MEDS BY ELIG CLIN: HCPCS | Performed by: NURSE PRACTITIONER

## 2019-03-01 PROCEDURE — G8482 FLU IMMUNIZE ORDER/ADMIN: HCPCS | Performed by: NURSE PRACTITIONER

## 2019-03-01 PROCEDURE — 1036F TOBACCO NON-USER: CPT | Performed by: NURSE PRACTITIONER

## 2019-03-01 ASSESSMENT — ENCOUNTER SYMPTOMS
COUGH: 0
NAUSEA: 0
DIARRHEA: 0
SHORTNESS OF BREATH: 0
CHEST TIGHTNESS: 0
TROUBLE SWALLOWING: 0

## 2019-03-04 ENCOUNTER — OFFICE VISIT (OUTPATIENT)
Dept: ORTHOPEDIC SURGERY | Age: 84
End: 2019-03-04
Payer: MEDICARE

## 2019-03-04 VITALS — BODY MASS INDEX: 21.35 KG/M2 | HEIGHT: 62 IN | WEIGHT: 116 LBS

## 2019-03-04 DIAGNOSIS — M25.562 ACUTE PAIN OF LEFT KNEE: Primary | ICD-10-CM

## 2019-03-04 PROCEDURE — 99214 OFFICE O/P EST MOD 30 MIN: CPT | Performed by: ORTHOPAEDIC SURGERY

## 2019-03-04 PROCEDURE — 20610 DRAIN/INJ JOINT/BURSA W/O US: CPT | Performed by: ORTHOPAEDIC SURGERY

## 2019-03-04 PROCEDURE — G8427 DOCREV CUR MEDS BY ELIG CLIN: HCPCS | Performed by: ORTHOPAEDIC SURGERY

## 2019-03-04 PROCEDURE — 1123F ACP DISCUSS/DSCN MKR DOCD: CPT | Performed by: ORTHOPAEDIC SURGERY

## 2019-03-04 PROCEDURE — 4040F PNEUMOC VAC/ADMIN/RCVD: CPT | Performed by: ORTHOPAEDIC SURGERY

## 2019-03-04 PROCEDURE — 1101F PT FALLS ASSESS-DOCD LE1/YR: CPT | Performed by: ORTHOPAEDIC SURGERY

## 2019-03-04 PROCEDURE — G8420 CALC BMI NORM PARAMETERS: HCPCS | Performed by: ORTHOPAEDIC SURGERY

## 2019-03-04 PROCEDURE — 1090F PRES/ABSN URINE INCON ASSESS: CPT | Performed by: ORTHOPAEDIC SURGERY

## 2019-03-04 PROCEDURE — G8482 FLU IMMUNIZE ORDER/ADMIN: HCPCS | Performed by: ORTHOPAEDIC SURGERY

## 2019-03-04 PROCEDURE — 1036F TOBACCO NON-USER: CPT | Performed by: ORTHOPAEDIC SURGERY

## 2019-03-11 ENCOUNTER — OFFICE VISIT (OUTPATIENT)
Dept: ORTHOPEDIC SURGERY | Age: 84
End: 2019-03-11
Payer: MEDICARE

## 2019-03-11 VITALS — HEIGHT: 62 IN | WEIGHT: 115.96 LBS | BODY MASS INDEX: 21.34 KG/M2

## 2019-03-11 DIAGNOSIS — M25.562 ACUTE PAIN OF LEFT KNEE: ICD-10-CM

## 2019-03-11 DIAGNOSIS — M17.12 PRIMARY OSTEOARTHRITIS OF LEFT KNEE: Primary | ICD-10-CM

## 2019-03-11 PROCEDURE — 1036F TOBACCO NON-USER: CPT | Performed by: ORTHOPAEDIC SURGERY

## 2019-03-11 PROCEDURE — 1090F PRES/ABSN URINE INCON ASSESS: CPT | Performed by: ORTHOPAEDIC SURGERY

## 2019-03-11 PROCEDURE — G8482 FLU IMMUNIZE ORDER/ADMIN: HCPCS | Performed by: ORTHOPAEDIC SURGERY

## 2019-03-11 PROCEDURE — 1101F PT FALLS ASSESS-DOCD LE1/YR: CPT | Performed by: ORTHOPAEDIC SURGERY

## 2019-03-11 PROCEDURE — 4040F PNEUMOC VAC/ADMIN/RCVD: CPT | Performed by: ORTHOPAEDIC SURGERY

## 2019-03-11 PROCEDURE — 1123F ACP DISCUSS/DSCN MKR DOCD: CPT | Performed by: ORTHOPAEDIC SURGERY

## 2019-03-11 PROCEDURE — G8420 CALC BMI NORM PARAMETERS: HCPCS | Performed by: ORTHOPAEDIC SURGERY

## 2019-03-11 PROCEDURE — 99214 OFFICE O/P EST MOD 30 MIN: CPT | Performed by: ORTHOPAEDIC SURGERY

## 2019-03-11 PROCEDURE — G8427 DOCREV CUR MEDS BY ELIG CLIN: HCPCS | Performed by: ORTHOPAEDIC SURGERY

## 2019-05-21 RX ORDER — ALBUTEROL SULFATE 90 UG/1
2 AEROSOL, METERED RESPIRATORY (INHALATION) EVERY 4 HOURS PRN
Qty: 1 INHALER | Refills: 1 | Status: SHIPPED | OUTPATIENT
Start: 2019-05-21 | End: 2019-05-24 | Stop reason: CLARIF

## 2019-05-24 RX ORDER — ALBUTEROL SULFATE 90 UG/1
2 AEROSOL, METERED RESPIRATORY (INHALATION) EVERY 4 HOURS PRN
Qty: 3 INHALER | Refills: 0 | Status: SHIPPED | OUTPATIENT
Start: 2019-05-24 | End: 2021-08-09 | Stop reason: CLARIF

## 2019-06-11 DIAGNOSIS — I10 HYPERTENSION, UNSPECIFIED TYPE: ICD-10-CM

## 2019-06-11 RX ORDER — LOSARTAN POTASSIUM AND HYDROCHLOROTHIAZIDE 12.5; 1 MG/1; MG/1
1 TABLET ORAL DAILY
Qty: 90 TABLET | Refills: 0 | Status: SHIPPED | OUTPATIENT
Start: 2019-06-11 | End: 2021-08-09 | Stop reason: CLARIF

## 2020-01-30 LAB
BUN BLDV-MCNC: 14 MG/DL (ref 7–20)
CREAT SERPL-MCNC: 0.6 MG/DL (ref 0.6–1.2)
GFR AFRICAN AMERICAN: >60
GFR NON-AFRICAN AMERICAN: >60

## 2020-02-04 ENCOUNTER — HOSPITAL ENCOUNTER (OUTPATIENT)
Dept: CT IMAGING | Age: 85
Discharge: HOME | End: 2020-02-04
Payer: MEDICARE

## 2020-02-04 PROCEDURE — 74177 CT ABD & PELVIS W/CONTRAST: CPT

## 2020-02-04 PROCEDURE — 6360000004 HC RX CONTRAST MEDICATION: Performed by: INTERNAL MEDICINE

## 2020-02-04 RX ADMIN — IOHEXOL 50 ML: 240 INJECTION, SOLUTION INTRATHECAL; INTRAVASCULAR; INTRAVENOUS; ORAL at 15:34

## 2020-02-04 RX ADMIN — IOPAMIDOL 80 ML: 755 INJECTION, SOLUTION INTRAVENOUS at 15:34

## 2020-11-04 ENCOUNTER — HOSPITAL ENCOUNTER (EMERGENCY)
Age: 85
Discharge: HOME | End: 2020-11-05
Attending: EMERGENCY MEDICINE
Payer: MEDICARE

## 2020-11-04 PROCEDURE — 99284 EMERGENCY DEPT VISIT MOD MDM: CPT

## 2020-11-04 PROCEDURE — 93005 ELECTROCARDIOGRAM TRACING: CPT | Performed by: EMERGENCY MEDICINE

## 2020-11-04 ASSESSMENT — PAIN SCALES - GENERAL: PAINLEVEL_OUTOF10: 4

## 2020-11-04 ASSESSMENT — PAIN DESCRIPTION - PAIN TYPE: TYPE: ACUTE PAIN

## 2020-11-04 ASSESSMENT — PAIN DESCRIPTION - LOCATION: LOCATION: BACK

## 2020-11-05 VITALS
TEMPERATURE: 97.5 F | DIASTOLIC BLOOD PRESSURE: 65 MMHG | WEIGHT: 99 LBS | HEART RATE: 73 BPM | OXYGEN SATURATION: 96 % | BODY MASS INDEX: 18.22 KG/M2 | HEIGHT: 62 IN | RESPIRATION RATE: 16 BRPM | SYSTOLIC BLOOD PRESSURE: 156 MMHG

## 2020-11-05 LAB
EKG ATRIAL RATE: 77 BPM
EKG DIAGNOSIS: NORMAL
EKG P AXIS: 47 DEGREES
EKG P-R INTERVAL: 156 MS
EKG Q-T INTERVAL: 378 MS
EKG QRS DURATION: 80 MS
EKG QTC CALCULATION (BAZETT): 427 MS
EKG R AXIS: -30 DEGREES
EKG T AXIS: -20 DEGREES
EKG VENTRICULAR RATE: 77 BPM

## 2020-11-05 PROCEDURE — 6370000000 HC RX 637 (ALT 250 FOR IP): Performed by: STUDENT IN AN ORGANIZED HEALTH CARE EDUCATION/TRAINING PROGRAM

## 2020-11-05 RX ORDER — HYDROCODONE BITARTRATE AND ACETAMINOPHEN 5; 325 MG/1; MG/1
1 TABLET ORAL ONCE
Status: COMPLETED | OUTPATIENT
Start: 2020-11-05 | End: 2020-11-05

## 2020-11-05 RX ORDER — HYDROCODONE BITARTRATE AND ACETAMINOPHEN 5; 325 MG/1; MG/1
1 TABLET ORAL EVERY 6 HOURS PRN
Qty: 12 TABLET | Refills: 0 | Status: SHIPPED | OUTPATIENT
Start: 2020-11-05 | End: 2020-11-08

## 2020-11-05 RX ADMIN — HYDROCODONE BITARTRATE AND ACETAMINOPHEN 1 TABLET: 5; 325 TABLET ORAL at 00:37

## 2020-11-05 NOTE — ED TRIAGE NOTES
Pt has a known compression fracture of T 6, pain increasing, difficulty laying down, and taking deep breaths. VSS.

## 2020-11-05 NOTE — ED PROVIDER NOTES
1 [facility]  EMERGENCY DEPARTMENT ENCOUNTER          [facility] RESIDENT NOTE       Date of evaluation: 11/4/2020    Chief Complaint     Back Pain (T6 Compression fracture)      History of Present Illness     Rosio Garcias is a 80 y.o. female with history of osteoporosis, hypertension, asthma who presents with back pain. In mid October she began work-up for back pain which resulted and MRI thoracic spine showing acute or recent superior endplate compression fracture of T6 vertebral body with 20 to 30% loss of height with no associated retropulsion. Treatment options were discussed with [facility] including kyphoplasty versus wearing a brace. Family wanted to pursue kyphoplasty but there was a 2-week wait. They came to the emergency department today due to continued pain on tramadol. The patient is unable to lay flat or on either of her sides which aggravates the pain located on her back and her right side. Patient denies any trouble breathing at this time but also endorses some chest tightness. They deny any loss of bowel or bladder continence, new focal lower extremity weakness or numbness. Review of Systems     Review of Systems   All other systems reviewed and are negative. Past Medical, Surgical, Family, and Social History     She has a past medical history of Asthma, Hypertension, Osteoarthritis, and Parkinson's disease ([address] She has a past surgical history that includes Cataract removal.  Her family history includes Other in an other family member. She reports that she has never smoked. She has never used smokeless tobacco. She reports that she does not drink alcohol or use drugs.     Medications     Previous Medications    ACETAMINOPHEN (TYLENOL) 500 MG TABLET    Take 500 mg by mouth    ADVAIR DISKUS 250-50 MCG/DOSE AEPB    USE 1 INHALATION TWICE A DAY    ALBUTEROL SULFATE HFA (PROVENTIL HFA) 108 (90 BASE) MCG/ACT INHALER    Inhale 2 puffs into the lungs every 4 hours as needed for Wheezing or Shortness of Breath    CARBIDOPA-LEVODOPA (SINEMET)  MG PER TABLET    Take 1 tablet by mouth 3 times daily    CYANOCOBALAMIN ([employer] VITAMIN B12) 1000 MCG TABLET    Take by mouth    DICLOFENAC SODIUM 1 % GEL    Apply 2 g topically 4 times daily as needed for Pain    DICYCLOMINE (BENTYL) 20 MG TABLET    Take 1 tablet by mouth every 6 hours    INCRUSE ELLIPTA 62.5 MCG/INH AEPB    USE 1 INHALATION DAILY    LOSARTAN-HYDROCHLOROTHIAZIDE (HYZAAR) 100-12.5 MG PER TABLET    Take 1 tablet by mouth daily    ORAL ELECTROLYTES (SUSTAIN PO)    Take by mouth    UMECLIDINIUM BROMIDE (INCRUSE ELLIPTA) 62.5 MCG/INH AEPB    Inhale 1 puff into the lungs daily    VITAMIN D 1000 UNITS CAPS    Take by mouth       Allergies     She has No Known Allergies. Physical Exam     INITIAL VITALS: BP: (!) 174/81, Temp: 97.5 °F (36.4 °C), Pulse: 79, Resp: 16, SpO2: 96 %   Physical Exam  Constitutional:       General: She is not in acute distress. Appearance: She is not ill-appearing. HENT:      Head: Normocephalic and atraumatic. Nose: Nose normal.      Mouth/Throat:      Mouth: Mucous membranes are moist.      Pharynx: Oropharynx is clear. Eyes:      Extraocular Movements: Extraocular movements intact. Pupils: Pupils are equal, round, and reactive to light. Cardiovascular:      Rate and Rhythm: Normal rate and regular rhythm. Pulses: Normal pulses. Heart sounds: Normal heart sounds. No murmur. No friction rub. No gallop. Pulmonary:      Effort: Pulmonary effort is normal. No respiratory distress. Breath sounds: Normal breath sounds. No stridor. No wheezing or rales. Abdominal:      General: Abdomen is flat. Palpations: Abdomen is soft. Tenderness: There is no abdominal tenderness. Musculoskeletal:         General: No swelling. Right lower leg: No edema. Left lower leg: No edema.       Comments: No spinal tenderness on palpation   Neurological: General: No focal deficit present. Mental Status: She is alert and oriented to person, place, and time. Comments: 5/5 strength in LE bilaterally  Sensation in tact in lower extremity bilaterally         Diagnostic Results     EKG   NA    RADIOLOGY:  No orders to display       LABS:   No results found for this visit on 11/04/20. ED BEDSIDE ULTRASOUND:  NA    RECENT VITALS:  BP: (!) 174/81, Temp: 97.5 °F (36.4 °C),Pulse: 79, Resp: 16, SpO2: 96 %     Procedures     NA    ED Course     Nursing Notes, Past Medical Hx, Past Surgical Hx, Social Hx, Allergies, and FamilyHx were reviewed. The patient was giventhe following medications:  Orders Placed This Encounter   Medications    HYDROcodone-acetaminophen (NORCO) 5-325 MG per tablet 1 tablet       CONSULTS:  None    MEDICAL DECISION MAKING / ASSESSMENT / Hawa Espino is a 80 y.o. female with history of osteoporosis, hypertension, asthma who presents with back pain. MRI on 10/27 showed T6 compression fracture with 20 to 30% loss of height and no retropulsion. Pain has continued since neurosurgery appointment 2 days ago and tramadol has not been affected, no signs of further spinal cord compression with no loss of bowel or bladder continence, numbness or weakness in lower extremities on exam.  No concern for progression of fracture on exam or with history and there is no need for reimaging. We provided the patient with 1 dose of Norco to test response to the emergency department. At the time of sign out we were waiting to see the patient's response to [facility] [facility]. If the pain is controlled then her pain management well be escalated for outpatient management until she can receive kyphoplasty. The patient was signed out to Dr. Terrace Riedel. This patient was also evaluated by the attending physician. All care plans were discussed and agreed upon. Clinical Impression     1.  Closed fracture of sixth thoracic vertebra, unspecified fracture morphology, initial encounter [facility])        Disposition     PATIENT REFERRED TO:  No follow-up provider specified.     DISCHARGE MEDICATIONS:  New Prescriptions    No medications on file       DISPOSITION    Pending     Nella Villegas MD  Resident  11/05/20 8861

## 2020-11-05 NOTE — ED PROVIDER NOTES
ED Attending Attestation Note     Date of evaluation: 11/4/2020    This patient was seen by the resident. I have seen and examined the patient, agree with the workup, evaluation, management and diagnosis. The care plan has been discussed. I have reviewed the ECG and concur with the resident's interpretation. My assessment reveals complaints of back pain. Patient has had issues with back pain for the last several weeks and was diagnosed with a T6 compression fracture likely secondary to osteoporosis based on an MRI. She has been seen by neurosurgery and is scheduled to have evaluation for kyphoplasty. She has been on tramadol for pain though there is been getting inadequate pain control per the patient's daughter, though the patient herself states her pain has been controlled. Patient has no reproducible tenderness on exam.  She has no weakness in the lower extremities. Patient was given 1 Norco and states her pain was well controlled. Patient be discharged with a short course of narcotic analgesics until she can have the evaluation for the kyphoplasty. Of note, the patient did have an EKG performed in triage because she initially complained of chest pain but denied chest pain to both myself and the resident. This EKG showed the patient to be in a normal sinus rhythm with left axis deviation, normal MD and QT intervals, normal QRS duration, no ST segment abnormalities, no T wave abnormalities.       Alfredo Mcardle, MD  11/05/20 [id number]

## 2021-08-09 ENCOUNTER — APPOINTMENT (OUTPATIENT)
Dept: CT IMAGING | Age: 86
DRG: 543 | End: 2021-08-09
Payer: MEDICARE

## 2021-08-09 ENCOUNTER — APPOINTMENT (OUTPATIENT)
Dept: GENERAL RADIOLOGY | Age: 86
DRG: 543 | End: 2021-08-09
Payer: MEDICARE

## 2021-08-09 ENCOUNTER — HOSPITAL ENCOUNTER (INPATIENT)
Age: 86
LOS: 2 days | Discharge: NURSING, REHAB OR LONG-TERM CARE | DRG: 543 | End: 2021-08-12
Attending: EMERGENCY MEDICINE | Admitting: INTERNAL MEDICINE
Payer: MEDICARE

## 2021-08-09 DIAGNOSIS — S32.810A MULTIPLE CLOSED FRACTURES OF PELVIS WITH STABLE DISRUPTION OF PELVIC RING, INITIAL ENCOUNTER (HCC): Primary | ICD-10-CM

## 2021-08-09 LAB
ANION GAP SERPL CALCULATED.3IONS-SCNC: 9 MMOL/L (ref 3–16)
BASOPHILS ABSOLUTE: 0.1 K/UL (ref 0–0.2)
BASOPHILS RELATIVE PERCENT: 0.5 %
BUN BLDV-MCNC: 12 MG/DL (ref 7–20)
CALCIUM SERPL-MCNC: 9.4 MG/DL (ref 8.3–10.6)
CHLORIDE BLD-SCNC: 105 MMOL/L (ref 99–110)
CO2: 28 MMOL/L (ref 21–32)
CREAT SERPL-MCNC: <0.5 MG/DL (ref 0.6–1.2)
EOSINOPHILS ABSOLUTE: 0.1 K/UL (ref 0–0.6)
EOSINOPHILS RELATIVE PERCENT: 0.6 %
GFR AFRICAN AMERICAN: >60
GFR NON-AFRICAN AMERICAN: >60
GLUCOSE BLD-MCNC: 112 MG/DL (ref 70–99)
HCT VFR BLD CALC: 40.6 % (ref 36–48)
HEMOGLOBIN: 13.6 G/DL (ref 12–16)
LYMPHOCYTES ABSOLUTE: 2.2 K/UL (ref 1–5.1)
LYMPHOCYTES RELATIVE PERCENT: 13.6 %
MCH RBC QN AUTO: 31.5 PG (ref 26–34)
MCHC RBC AUTO-ENTMCNC: 33.3 G/DL (ref 31–36)
MCV RBC AUTO: 94.5 FL (ref 80–100)
MONOCYTES ABSOLUTE: 0.9 K/UL (ref 0–1.3)
MONOCYTES RELATIVE PERCENT: 5.7 %
NEUTROPHILS ABSOLUTE: 12.6 K/UL (ref 1.7–7.7)
NEUTROPHILS RELATIVE PERCENT: 79.6 %
PDW BLD-RTO: 13 % (ref 12.4–15.4)
PLATELET # BLD: 319 K/UL (ref 135–450)
PMV BLD AUTO: 7.3 FL (ref 5–10.5)
POTASSIUM REFLEX MAGNESIUM: 4.3 MMOL/L (ref 3.5–5.1)
RBC # BLD: 4.3 M/UL (ref 4–5.2)
SODIUM BLD-SCNC: 142 MMOL/L (ref 136–145)
WBC # BLD: 15.8 K/UL (ref 4–11)

## 2021-08-09 PROCEDURE — 85025 COMPLETE CBC W/AUTO DIFF WBC: CPT

## 2021-08-09 PROCEDURE — 80048 BASIC METABOLIC PNL TOTAL CA: CPT

## 2021-08-09 PROCEDURE — G0378 HOSPITAL OBSERVATION PER HR: HCPCS

## 2021-08-09 PROCEDURE — 2580000003 HC RX 258: Performed by: INTERNAL MEDICINE

## 2021-08-09 PROCEDURE — 6370000000 HC RX 637 (ALT 250 FOR IP): Performed by: INTERNAL MEDICINE

## 2021-08-09 PROCEDURE — 73502 X-RAY EXAM HIP UNI 2-3 VIEWS: CPT

## 2021-08-09 PROCEDURE — 6370000000 HC RX 637 (ALT 250 FOR IP): Performed by: EMERGENCY MEDICINE

## 2021-08-09 PROCEDURE — 99285 EMERGENCY DEPT VISIT HI MDM: CPT

## 2021-08-09 PROCEDURE — 94664 DEMO&/EVAL PT USE INHALER: CPT

## 2021-08-09 PROCEDURE — 72192 CT PELVIS W/O DYE: CPT

## 2021-08-09 PROCEDURE — 73700 CT LOWER EXTREMITY W/O DYE: CPT

## 2021-08-09 RX ORDER — POLYETHYLENE GLYCOL 3350 17 G/17G
17 POWDER, FOR SOLUTION ORAL DAILY PRN
Status: DISCONTINUED | OUTPATIENT
Start: 2021-08-09 | End: 2021-08-12 | Stop reason: HOSPADM

## 2021-08-09 RX ORDER — LOSARTAN POTASSIUM 25 MG/1
25 TABLET ORAL DAILY
COMMUNITY

## 2021-08-09 RX ORDER — BUDESONIDE AND FORMOTEROL FUMARATE DIHYDRATE 160; 4.5 UG/1; UG/1
2 AEROSOL RESPIRATORY (INHALATION) 2 TIMES DAILY
Status: DISCONTINUED | OUTPATIENT
Start: 2021-08-09 | End: 2021-08-09 | Stop reason: CLARIF

## 2021-08-09 RX ORDER — MONTELUKAST SODIUM 10 MG/1
10 TABLET ORAL NIGHTLY
COMMUNITY

## 2021-08-09 RX ORDER — VITAMIN B COMPLEX
1000 TABLET ORAL DAILY
Status: DISCONTINUED | OUTPATIENT
Start: 2021-08-10 | End: 2021-08-12 | Stop reason: HOSPADM

## 2021-08-09 RX ORDER — ALBUTEROL SULFATE 2.5 MG/3ML
2.5 SOLUTION RESPIRATORY (INHALATION) EVERY 6 HOURS PRN
Status: DISCONTINUED | OUTPATIENT
Start: 2021-08-09 | End: 2021-08-12 | Stop reason: HOSPADM

## 2021-08-09 RX ORDER — UBIDECARENONE 75 MG
100 CAPSULE ORAL DAILY
Status: DISCONTINUED | OUTPATIENT
Start: 2021-08-10 | End: 2021-08-12 | Stop reason: HOSPADM

## 2021-08-09 RX ORDER — QUETIAPINE FUMARATE 25 MG/1
75 TABLET, FILM COATED ORAL NIGHTLY
COMMUNITY

## 2021-08-09 RX ORDER — ARFORMOTEROL TARTRATE 15 UG/2ML
15 SOLUTION RESPIRATORY (INHALATION) 2 TIMES DAILY
Status: DISCONTINUED | OUTPATIENT
Start: 2021-08-09 | End: 2021-08-12 | Stop reason: HOSPADM

## 2021-08-09 RX ORDER — SODIUM CHLORIDE 0.9 % (FLUSH) 0.9 %
5-40 SYRINGE (ML) INJECTION EVERY 12 HOURS SCHEDULED
Status: DISCONTINUED | OUTPATIENT
Start: 2021-08-09 | End: 2021-08-12 | Stop reason: HOSPADM

## 2021-08-09 RX ORDER — ONDANSETRON 2 MG/ML
4 INJECTION INTRAMUSCULAR; INTRAVENOUS EVERY 6 HOURS PRN
Status: DISCONTINUED | OUTPATIENT
Start: 2021-08-09 | End: 2021-08-12 | Stop reason: HOSPADM

## 2021-08-09 RX ORDER — LOSARTAN POTASSIUM 25 MG/1
25 TABLET ORAL DAILY
Status: DISCONTINUED | OUTPATIENT
Start: 2021-08-10 | End: 2021-08-10

## 2021-08-09 RX ORDER — ONDANSETRON 4 MG/1
4 TABLET, ORALLY DISINTEGRATING ORAL EVERY 8 HOURS PRN
Status: DISCONTINUED | OUTPATIENT
Start: 2021-08-09 | End: 2021-08-12 | Stop reason: HOSPADM

## 2021-08-09 RX ORDER — SODIUM CHLORIDE 0.9 % (FLUSH) 0.9 %
5-40 SYRINGE (ML) INJECTION PRN
Status: DISCONTINUED | OUTPATIENT
Start: 2021-08-09 | End: 2021-08-12 | Stop reason: HOSPADM

## 2021-08-09 RX ORDER — CARBIDOPA AND LEVODOPA 50; 200 MG/1; MG/1
1 TABLET, EXTENDED RELEASE ORAL NIGHTLY
COMMUNITY

## 2021-08-09 RX ORDER — ACETAMINOPHEN 650 MG/1
650 SUPPOSITORY RECTAL EVERY 6 HOURS PRN
Status: DISCONTINUED | OUTPATIENT
Start: 2021-08-09 | End: 2021-08-11

## 2021-08-09 RX ORDER — POLYETHYLENE GLYCOL 3350 17 G/17G
17 POWDER, FOR SOLUTION ORAL NIGHTLY PRN
COMMUNITY

## 2021-08-09 RX ORDER — ACETAMINOPHEN 325 MG/1
650 TABLET ORAL EVERY 6 HOURS PRN
Status: DISCONTINUED | OUTPATIENT
Start: 2021-08-09 | End: 2021-08-11

## 2021-08-09 RX ORDER — BUDESONIDE 0.5 MG/2ML
0.5 INHALANT ORAL 2 TIMES DAILY
Status: DISCONTINUED | OUTPATIENT
Start: 2021-08-09 | End: 2021-08-12 | Stop reason: HOSPADM

## 2021-08-09 RX ORDER — HYDROCODONE BITARTRATE AND ACETAMINOPHEN 5; 325 MG/1; MG/1
2 TABLET ORAL ONCE
Status: COMPLETED | OUTPATIENT
Start: 2021-08-09 | End: 2021-08-09

## 2021-08-09 RX ORDER — CARBIDOPA AND LEVODOPA 25; 100 MG/1; MG/1
2 TABLET, EXTENDED RELEASE ORAL NIGHTLY
Status: DISCONTINUED | OUTPATIENT
Start: 2021-08-09 | End: 2021-08-12 | Stop reason: HOSPADM

## 2021-08-09 RX ORDER — QUETIAPINE FUMARATE 25 MG/1
75 TABLET, FILM COATED ORAL NIGHTLY
Status: DISCONTINUED | OUTPATIENT
Start: 2021-08-09 | End: 2021-08-12 | Stop reason: HOSPADM

## 2021-08-09 RX ORDER — DICYCLOMINE HCL 20 MG
20 TABLET ORAL 2 TIMES DAILY PRN
Status: DISCONTINUED | OUTPATIENT
Start: 2021-08-09 | End: 2021-08-12 | Stop reason: HOSPADM

## 2021-08-09 RX ORDER — QUETIAPINE FUMARATE 25 MG/1
75 TABLET, FILM COATED ORAL NIGHTLY
COMMUNITY
End: 2021-08-09 | Stop reason: CLARIF

## 2021-08-09 RX ORDER — POLYETHYLENE GLYCOL 3350 17 G/17G
17 POWDER, FOR SOLUTION ORAL NIGHTLY PRN
Status: DISCONTINUED | OUTPATIENT
Start: 2021-08-09 | End: 2021-08-09 | Stop reason: SDUPTHER

## 2021-08-09 RX ORDER — MONTELUKAST SODIUM 10 MG/1
10 TABLET ORAL NIGHTLY
Status: DISCONTINUED | OUTPATIENT
Start: 2021-08-09 | End: 2021-08-12 | Stop reason: HOSPADM

## 2021-08-09 RX ORDER — DICYCLOMINE HCL 20 MG
20 TABLET ORAL 2 TIMES DAILY
COMMUNITY

## 2021-08-09 RX ORDER — PANTOPRAZOLE SODIUM 20 MG/1
20 TABLET, DELAYED RELEASE ORAL DAILY
COMMUNITY
End: 2021-08-09 | Stop reason: CLARIF

## 2021-08-09 RX ORDER — SODIUM CHLORIDE 9 MG/ML
25 INJECTION, SOLUTION INTRAVENOUS PRN
Status: DISCONTINUED | OUTPATIENT
Start: 2021-08-09 | End: 2021-08-12 | Stop reason: HOSPADM

## 2021-08-09 RX ORDER — UBIDECARENONE 75 MG
100 CAPSULE ORAL DAILY
COMMUNITY

## 2021-08-09 RX ORDER — AMANTADINE HYDROCHLORIDE 100 MG/1
100 TABLET ORAL NIGHTLY
COMMUNITY
End: 2021-08-09 | Stop reason: CLARIF

## 2021-08-09 RX ORDER — ALBUTEROL SULFATE 90 UG/1
2 AEROSOL, METERED RESPIRATORY (INHALATION) EVERY 6 HOURS PRN
COMMUNITY

## 2021-08-09 RX ADMIN — Medication 10 ML: at 21:01

## 2021-08-09 RX ADMIN — MONTELUKAST 10 MG: 10 TABLET, FILM COATED ORAL at 21:00

## 2021-08-09 RX ADMIN — HYDROCODONE BITARTRATE AND ACETAMINOPHEN 2 TABLET: 5; 325 TABLET ORAL at 11:28

## 2021-08-09 RX ADMIN — QUETIAPINE FUMARATE 75 MG: 25 TABLET ORAL at 21:00

## 2021-08-09 RX ADMIN — ACETAMINOPHEN 650 MG: 325 TABLET ORAL at 21:05

## 2021-08-09 RX ADMIN — CARBIDOPA AND LEVODOPA 2 TABLET: 25; 100 TABLET, EXTENDED RELEASE ORAL at 21:00

## 2021-08-09 ASSESSMENT — PAIN DESCRIPTION - LOCATION
LOCATION: HIP

## 2021-08-09 ASSESSMENT — PAIN DESCRIPTION - ORIENTATION
ORIENTATION: LEFT

## 2021-08-09 ASSESSMENT — PAIN SCALES - GENERAL
PAINLEVEL_OUTOF10: 8
PAINLEVEL_OUTOF10: 10
PAINLEVEL_OUTOF10: 0
PAINLEVEL_OUTOF10: 4
PAINLEVEL_OUTOF10: 8

## 2021-08-09 NOTE — RT PROTOCOL NOTE
RT Nebulizer Bronchodilator Protocol Note    There is a bronchodilator order in the chart from a provider indicating to follow the RT Bronchodilator Protocol and there is an Initiate RT Bronchodilator Protocol order as well (see protocol at bottom of note). The findings from the last RT Protocol Assessment were as follows:  Smoking: Non smoker  Surgical Status: General surgery/Lower abdominal  Xray: Clear  Respiratory Pattern: RR 12-20  Mental Status: Alert and Oriented  Breath Sounds: Clear  Cough: Strong, spontaneous, non-productive  Activity Level: Walking with assistance  Oxygen Requirement: Room Air - 2LNC/28% or home setting  Indication for Bronchodilator Therapy: On home bronchodilators  Bronchodilator Assessment Score: 0    Aerosolized bronchodilator medication orders have not been revised according to the RT Bronchodilator Protocol, home regimen. RT Bronchodilator Protocol:    Respiratory Therapist to perform RT Therapy Protocol Assessment then follow the protocol. No Indications - adjust the frequency to every 6 hours PRN wheezing or bronchospasm, if no treatments needed after 48 hours then discontinue using Per Protocol order mode. If indication present, adjust the RT bronchodilator orders based on the Bronchodilator Assessment Score as follows:    0-6 - enter or revise RT Bronchodilator order to Albuterol Nebulizer order with frequency of every 2 hours PRN for wheezing or increased work of breathing using Per Protocol order mode. Repeat RT Therapy Protocol Assessment as needed. 7-10 - discontinue any other Inpatient aerosolized bronchodilator medication orders and enter or revise two Albuterol Nebulizer orders, one with BID frequency and one with frequency of every 2 hours PRN wheezing or increased work of breathing using Per Protocol order mode. Repeat RT Therapy Protocol Assessment with second treatment then BID and as needed.       11-13 - discontinue any other Inpatient aerosolized bronchodilator medication orders and enter DuoNeb Nebulizer order with QID frequency and an Albuterol Nebulizer order with frequency of every 2 hours PRN wheezing or increased work of breathing using Per Protocol order mode. Repeat RT Therapy Protocol Assessment with second treatment then QID and as needed. Greater than 13 - discontinue any other Inpatient aerosolized bronchodilator medication orders and enter a DuoNeb Nebulizer order with every 4 hours frequency and an Albuterol Nebulizer order with frequency of every 2 hours PRN wheezing or increased work of breathing using Per Protocol order mode. Repeat RT Therapy Protocol Assessment with second treatment then every 4 hours and as needed. RT to enter RT Home Evaluation for COPD & MDI Assessment order using Per Protocol order mode.     Electronically signed by Pawan Cole RCP on 8/9/2021 at 6:45 PM

## 2021-08-09 NOTE — ED PROVIDER NOTES
[address] [facility]          ATTENDING PHYSICIAN NOTE       Date of evaluation: 8/9/2021    Chief Complaint     Fall (pt was walking on carpet and went to turn and fell) and Hip Pain (c/o left hip/thigh pain)      History of Present Illness     Shauna Blue is a 80 y.o. female who presents with complaints of pain in the left groin region, radiating into the left thigh. The patient lives at home with her daughter, states that she was walking across the carpet, turned, and fell to the ground. She believes that she caught her foot or lost her balance, and denies any antecedent dizziness or lightheadedness, chest pain or shortness of breath, or other symptoms. Since the fall, she has had pain in the left groin area, which radiates into the medial left thigh, is worse when she attempts to sit up, and prevented her from rising to her feet. She describes this pain as 10 out of 10 in intensity, worse with movements, constant since onset. She denies any head trauma or loss of consciousness, neck or back pain, upper extremity, chest, or abdominal pain. Review of Systems     Review of Systems   All other systems reviewed and are negative. Past Medical, Surgical, Family, and Social History     She has a past medical history of Asthma, Hypertension, Osteoarthritis, and Parkinson's disease ([address] She has a past surgical history that includes Cataract removal.  Her family history includes Other in an other family member. She reports that she has never smoked. She has never used smokeless tobacco. She reports that she does not drink alcohol and does not use drugs.     Medications     Previous Medications    ALBUTEROL SULFATE HFA (VENTOLIN HFA) 108 (90 BASE) MCG/ACT INHALER    Inhale 2 puffs into the lungs every 6 hours as needed for Wheezing    CARBIDOPA-LEVODOPA (SINEMET CR)  MG PER EXTENDED RELEASE TABLET    Take 1 tablet by mouth nightly    CARBIDOPA-LEVODOPA (SINEMET)  MG PER TABLET    Take 2 tablets by mouth 2 times daily Give at 0700 and 1200    CARBIDOPA-LEVODOPA (SINEMET)  MG PER TABLET    Take 1.5 tablets by mouth Daily with supper Give at 1700    DICYCLOMINE (BENTYL) 20 MG TABLET    Take 20 mg by mouth 2 times daily    FLUTICASONE-SALMETEROL (ADVAIR) 250-50 MCG/DOSE AEPB    Inhale 1 puff into the lungs every 12 hours    LOSARTAN (COZAAR) 25 MG TABLET    Take 25 mg by mouth daily    MONTELUKAST (SINGULAIR) 10 MG TABLET    Take 10 mg by mouth nightly    POLYETHYLENE GLYCOL (GLYCOLAX) 17 G PACKET    Take 17 g by mouth nightly as needed for Constipation    QUETIAPINE (SEROQUEL) 25 MG TABLET    Take 75 mg by mouth nightly    TIOTROPIUM (SPIRIVA RESPIMAT) 1.25 MCG/ACT AERS INHALER    Inhale 2 puffs into the lungs daily     VITAMIN B-12 (CYANOCOBALAMIN) 100 MCG TABLET    Take 100 mcg by mouth daily    VITAMIN D (CHOLECALCIFEROL) 25 MCG (1000 UT) TABS TABLET    Take 1,000 Units by mouth daily       Allergies     She has No Known Allergies. Physical Exam     INITIAL VITALS: BP: (!) 140/66, Temp: 97.8 °F (36.6 °C), Pulse: 88, Resp: 15, SpO2: 98 %     General: Frail appearing elderly patient. Overall well appearing. Pleasantly conversational, and in NAD. HEENT: Head is atraumatic, normocephalic. Pupils are equal, round, and reactive to light. Extraocular muscles are intact. Conjunctivae are clear and moist. No redness or drainage from the eyes. No drainage from the nose. The oropharynx appeared to be normal.    Neck: Supple, with full range of motion. No midline C-spine tenderness to palpation, crepitus, or step-offs. Back: No CVA tenderness. No midline T or L spine tenderness to palpation, crepitus, or step-offs. Chest: Not tender to palpation. Cardiovascular: Normal S1-S2. 2+ radial pulses bilaterally. Respiratory: Unlabored breathing with equal chest rise and fall. Lungs are clear to auscultation bilaterally.  No adventitious lung sounds heard.    Abdomen: Soft and nontender, without guarding or rebound tenderness. No masses or hepatosplenomegaly. Skin: Warm and dry, without rashes or ecchymoses, lacerations or abrasions. Neuro: Alert and oriented x3. No focal neurologic deficits are noted. Extremities: Warm and well-perfused, without clubbing, cyanosis, or edema. Patient has tenderness to palpation that is more present over the anterior pelvic region, than in the left leg. She has no tenderness to palpation over the lateral aspect of the hip. No pain on logroll or flexion extension of the hip, but she does have pain on attempting to sit up, in the left pelvic region. No other long bone or joint deformities, swelling, tenderness to palpation, or limitation of range of motion by pain are noted. Psych: The patient's mood and affect are generally within normal limits for their presentation. Diagnostic Results       RADIOLOGY:  CT PELVIS WO CONTRAST Additional Contrast? None   Final Result      Acute comminuted fracture of the left pubic rami lateral to the symphysis. Vertically oriented fracture of the anterior right sacrum. Chronic, grade 1 anterolisthesis of L4 on L5, and degenerative changes of the posterior elements as described in the lower lumbar spine. Degenerative arthritis of the right hip joint greater than left. No acute fracture of the hips is seen. CT HIP LEFT WO CONTRAST   Final Result      Comminuted fracture of the anteromedial left superior pubic ramus. The left hip joint itself is intact. The sacrum is not included on this exam. Additional CT imaging of the pelvis is recommended to exclude an additional fracture. XR HIP 2-3 VW W PELVIS LEFT   Final Result      No fracture          LABS:   No results found for this visit on 08/09/21.       RECENT VITALS:  BP: (!) 164/72, Temp: 97.8 °F (36.6 °C), Pulse: 80, Resp: 15, SpO2: 98 %     Procedures       ED Course Nursing Notes, Past Medical Hx, Past Surgical Hx, Social Hx, Allergies, and Family Hx were reviewed. The patient was given the following medications:  Orders Placed This Encounter   Medications    HYDROcodone-acetaminophen ([address]) 5-325 MG per tablet 2 tablet       CONSULTS:  IP CONSULT TO Bettina Hickey / BÁRBARA / Yaya Leroy is a 80 y.o. female, generally healthy, with a history of Parkinson's, but who lives mostly independently at home with her daughter, who presents with left pubic rami and right sacral fractures after a mechanical fall at home. In discussion with orthopedic surgery, the patient's injuries are nonsurgical, but she is not able to be completely weightbearing because of the sacral component of the fracture. Will consult on the patient with further recommendations inpatient, and she will likely require inpatient rehab. Clinical Impression     1. Multiple closed fractures of pelvis with stable disruption of pelvic ring, initial encounter ([address]        Disposition     PATIENT REFERRED TO:  No follow-up provider specified. DISCHARGE MEDICATIONS:  New Prescriptions    No medications on file       DISPOSITION Decision To Admit    (Please note that portions of this note were completed with voice recognition software.   Efforts were made to edit the dictations but occasionally words are mis-transcribed.)     Fay Nelson MD  08/09/21 [id number]

## 2021-08-09 NOTE — PROGRESS NOTES
Lovenox 40 mg daily ordered for patient. This medication is renally eliminated. Will change dose to 30 mg due to weight [less than 45 kg]    CrCl cannot be calculated (This lab value cannot be used to calculate CrCl because it is not a number: <0.5). Pharmacy will continue to monitor renal function and adjust dose as necessary. Please call with any questions. Thanks!

## 2021-08-09 NOTE — PROGRESS NOTES
Admission Progress Note  8/9/2021 7:04 PM     Data: Patient to room 5311. See doc flow sheets for assessments and screenings. Action: Patient oriented to room and call light. Patient informed of plan of care. Reviewed the patient education folder with the patient and/or family. Patient instructed to call nurse with any needs or concerns. Response:  Patient verbalized understanding of plan of care and all instructions. Bed locked and in lowest position. Call light in reach. Will continue to monitor patient per plan of care.     Lydia Young RN  ________________________________________________________________________

## 2021-08-09 NOTE — PROGRESS NOTES
4 Eyes Admission Assessment     I agree as the admission nurse that 2 RN's have performed a thorough Head to Toe Skin Assessment on the patient. ALL assessment sites listed below have been assessed on admission. Areas assessed by both nurses:   [x]   Head, Face, and Ears   [x]   Shoulders, Back, and Chest  [x]   Arms, Elbows, and Hands - redness and bruising  [x]   Coccyx, Sacrum, and Ischium some redness on coccyx  [x]   Legs, Feet, and Heels -redness on heels        Does the Patient have Skin Breakdown?   No         Robby Prevention initiated:  NA   Wound Care Orders initiated:  NA      WOC nurse consulted for Pressure Injury (Stage 3,4, Unstageable, DTI, NWPT, and Complex wounds) or Robby score 18 or lower:  NA      Nurse 1 eSignature: Electronically signed by Caren Espinoza RN on 8/9/21 at 7:05 PM EDT    **SHARE this note so that the co-signing nurse is able to place an eSignature**    Nurse 2 eSignature: Electronically signed by Mark Townsend RN on 8/9/21 at 7:27 PM EDT

## 2021-08-09 NOTE — ED NOTES
Home Medication List is complete. Spoke with patient's daughter in her ED room. She was able to confirm all medications, as she helps take care of her mother's meds. Daughter states that she did have all morning meds today. Please note:    · Amantadine -- prescribed as 100mg daily. Daughter states this exacerbates hallucigens, so she has cut it to 50mg daily. However, daughter was process of removing this altogether from her mother's regimen. Per daughter, will leave off home med list for now. Changes made to the Home Medication List:   Removed:    APAP   Incruse Ellipta   Losartan-HCTZ combo    Added:   Losartan   Montelukast   Albuterol MDI   Spiriva respimat   Sinemet CR   Quetiapine   Miralax prn    Changes:    Sinemet 25/100 -- per daughter, takes 2 tabs at 0700 and 1200, then 1.5 tabs at 1700.         Maddie Zaragoza PharmD., BCPS   8/9/2021 10:21 AM  Wireless: [phone]

## 2021-08-10 ENCOUNTER — APPOINTMENT (OUTPATIENT)
Dept: GENERAL RADIOLOGY | Age: 86
DRG: 543 | End: 2021-08-10
Payer: MEDICARE

## 2021-08-10 PROBLEM — E86.1 HYPOVOLEMIA: Status: ACTIVE | Noted: 2021-08-10

## 2021-08-10 PROBLEM — E44.1 MILD MALNUTRITION (HCC): Chronic | Status: ACTIVE | Noted: 2021-08-10

## 2021-08-10 LAB
ANION GAP SERPL CALCULATED.3IONS-SCNC: 12 MMOL/L (ref 3–16)
BASOPHILS ABSOLUTE: 0.1 K/UL (ref 0–0.2)
BASOPHILS RELATIVE PERCENT: 0.7 %
BUN BLDV-MCNC: 16 MG/DL (ref 7–20)
CALCIUM SERPL-MCNC: 8.7 MG/DL (ref 8.3–10.6)
CHLORIDE BLD-SCNC: 109 MMOL/L (ref 99–110)
CO2: 20 MMOL/L (ref 21–32)
CREAT SERPL-MCNC: <0.5 MG/DL (ref 0.6–1.2)
EKG ATRIAL RATE: 77 BPM
EKG DIAGNOSIS: NORMAL
EKG P AXIS: 42 DEGREES
EKG P-R INTERVAL: 148 MS
EKG Q-T INTERVAL: 400 MS
EKG QRS DURATION: 74 MS
EKG QTC CALCULATION (BAZETT): 452 MS
EKG R AXIS: -22 DEGREES
EKG T AXIS: 5 DEGREES
EKG VENTRICULAR RATE: 77 BPM
EOSINOPHILS ABSOLUTE: 0.1 K/UL (ref 0–0.6)
EOSINOPHILS RELATIVE PERCENT: 1.3 %
GFR AFRICAN AMERICAN: >60
GFR NON-AFRICAN AMERICAN: >60
GLUCOSE BLD-MCNC: 136 MG/DL (ref 70–99)
GLUCOSE BLD-MCNC: 88 MG/DL (ref 70–99)
HCT VFR BLD CALC: 37.2 % (ref 36–48)
HCT VFR BLD CALC: 37.5 % (ref 36–48)
HEMOGLOBIN: 12.5 G/DL (ref 12–16)
HEMOGLOBIN: 12.7 G/DL (ref 12–16)
LACTIC ACID: 0.9 MMOL/L (ref 0.4–2)
LYMPHOCYTES ABSOLUTE: 2.1 K/UL (ref 1–5.1)
LYMPHOCYTES RELATIVE PERCENT: 21 %
MCH RBC QN AUTO: 31.8 PG (ref 26–34)
MCHC RBC AUTO-ENTMCNC: 33.8 G/DL (ref 31–36)
MCV RBC AUTO: 94.1 FL (ref 80–100)
MONOCYTES ABSOLUTE: 0.9 K/UL (ref 0–1.3)
MONOCYTES RELATIVE PERCENT: 8.8 %
NEUTROPHILS ABSOLUTE: 6.7 K/UL (ref 1.7–7.7)
NEUTROPHILS RELATIVE PERCENT: 68.2 %
PDW BLD-RTO: 13 % (ref 12.4–15.4)
PERFORMED ON: ABNORMAL
PLATELET # BLD: 268 K/UL (ref 135–450)
PMV BLD AUTO: 7.4 FL (ref 5–10.5)
POTASSIUM REFLEX MAGNESIUM: 4 MMOL/L (ref 3.5–5.1)
RBC # BLD: 3.95 M/UL (ref 4–5.2)
SODIUM BLD-SCNC: 141 MMOL/L (ref 136–145)
TROPONIN: <0.01 NG/ML
WBC # BLD: 9.9 K/UL (ref 4–11)

## 2021-08-10 PROCEDURE — 6360000002 HC RX W HCPCS: Performed by: INTERNAL MEDICINE

## 2021-08-10 PROCEDURE — 85014 HEMATOCRIT: CPT

## 2021-08-10 PROCEDURE — 87040 BLOOD CULTURE FOR BACTERIA: CPT

## 2021-08-10 PROCEDURE — 27197 CLSD TX PELVIC RING FX: CPT | Performed by: ORTHOPAEDIC SURGERY

## 2021-08-10 PROCEDURE — 85025 COMPLETE CBC W/AUTO DIFF WBC: CPT

## 2021-08-10 PROCEDURE — 96361 HYDRATE IV INFUSION ADD-ON: CPT

## 2021-08-10 PROCEDURE — 2580000003 HC RX 258: Performed by: INTERNAL MEDICINE

## 2021-08-10 PROCEDURE — G0378 HOSPITAL OBSERVATION PER HR: HCPCS

## 2021-08-10 PROCEDURE — 6370000000 HC RX 637 (ALT 250 FOR IP): Performed by: INTERNAL MEDICINE

## 2021-08-10 PROCEDURE — 36415 COLL VENOUS BLD VENIPUNCTURE: CPT

## 2021-08-10 PROCEDURE — 83605 ASSAY OF LACTIC ACID: CPT

## 2021-08-10 PROCEDURE — 96360 HYDRATION IV INFUSION INIT: CPT

## 2021-08-10 PROCEDURE — 93010 ELECTROCARDIOGRAM REPORT: CPT | Performed by: INTERNAL MEDICINE

## 2021-08-10 PROCEDURE — 84484 ASSAY OF TROPONIN QUANT: CPT

## 2021-08-10 PROCEDURE — 93005 ELECTROCARDIOGRAM TRACING: CPT | Performed by: STUDENT IN AN ORGANIZED HEALTH CARE EDUCATION/TRAINING PROGRAM

## 2021-08-10 PROCEDURE — 85018 HEMOGLOBIN: CPT

## 2021-08-10 PROCEDURE — 1200000000 HC SEMI PRIVATE

## 2021-08-10 PROCEDURE — 80048 BASIC METABOLIC PNL TOTAL CA: CPT

## 2021-08-10 PROCEDURE — 71045 X-RAY EXAM CHEST 1 VIEW: CPT

## 2021-08-10 RX ORDER — LANOLIN ALCOHOL/MO/W.PET/CERES
3 CREAM (GRAM) TOPICAL EVERY EVENING
Status: DISCONTINUED | OUTPATIENT
Start: 2021-08-10 | End: 2021-08-12 | Stop reason: HOSPADM

## 2021-08-10 RX ORDER — LOSARTAN POTASSIUM 25 MG/1
25 TABLET ORAL DAILY
Status: DISCONTINUED | OUTPATIENT
Start: 2021-08-10 | End: 2021-08-12 | Stop reason: HOSPADM

## 2021-08-10 RX ORDER — 0.9 % SODIUM CHLORIDE 0.9 %
1000 INTRAVENOUS SOLUTION INTRAVENOUS ONCE
Status: COMPLETED | OUTPATIENT
Start: 2021-08-10 | End: 2021-08-10

## 2021-08-10 RX ADMIN — Medication 10 ML: at 09:09

## 2021-08-10 RX ADMIN — QUETIAPINE FUMARATE 75 MG: 25 TABLET ORAL at 21:39

## 2021-08-10 RX ADMIN — Medication 10 ML: at 21:40

## 2021-08-10 RX ADMIN — LOSARTAN POTASSIUM 25 MG: 25 TABLET, FILM COATED ORAL at 13:18

## 2021-08-10 RX ADMIN — ENOXAPARIN SODIUM 30 MG: 30 INJECTION SUBCUTANEOUS at 19:47

## 2021-08-10 RX ADMIN — CARBIDOPA AND LEVODOPA 1.5 TABLET: 25; 100 TABLET ORAL at 19:47

## 2021-08-10 RX ADMIN — MONTELUKAST 10 MG: 10 TABLET, FILM COATED ORAL at 21:39

## 2021-08-10 RX ADMIN — CARBIDOPA AND LEVODOPA 2 TABLET: 25; 100 TABLET ORAL at 13:17

## 2021-08-10 RX ADMIN — ACETAMINOPHEN 650 MG: 325 TABLET ORAL at 21:39

## 2021-08-10 RX ADMIN — Medication 3 MG: at 19:48

## 2021-08-10 RX ADMIN — SODIUM CHLORIDE 1000 ML: 0.9 INJECTION, SOLUTION INTRAVENOUS at 01:07

## 2021-08-10 ASSESSMENT — PAIN SCALES - GENERAL
PAINLEVEL_OUTOF10: 3
PAINLEVEL_OUTOF10: 3

## 2021-08-10 NOTE — ACP (ADVANCE CARE PLANNING)
ADVANCED CARE PLANNING    Name:Jolly Licona       :  1926              MRN:  [id number]      Purpose of Encounter: Advanced care planning in light of acute and chronic medical problems. Parties in attendance: :Neal Donovan MD,   Decisional Capacity:Yes    Subjective: Patient/family understand in this voluntary conversation what inteventions and plans patient would want implemented in light of the following diagnoses:    Diagnosis: Active Problems:    Pelvic ring fracture, closed, initial encounter [facility])  Resolved Problems:    * No resolved hospital problems. *    Patients Medical Story:80year-old female with past medical history of parkinsonism, hypertension, depression presented to the hospital due to left groin/thigh pain. Patient lives at home with her daughter and stated that she was walking across the carpet when she fell to the ground. She denied any loss of consciousness or prior dizziness or lightheadedness prior to the fall. She stated that she just got her fourth and lost her balance. Due to significant pain she presented to the hospital and was noted to have acute comminuted fracture of the left pubic rami lateral to the symphysis. Due to the pain and inability to ambulate due to the pain patient was admitted to the hospital for the work-up and management. Discussion highlights: I discussed with patient advanced directives/ impending END of life event in the light of above diagnosis, we discussed the needs for possible CPR, intubation/ventilator support, CPR. In such an event patient does not want CPR/intubation. Goals of Care Determinations: Patient wishes to focus on getting better  Plan: Will notify Rojelio Junior MD of change in care plan. Will look at further interventions as needed. Code Status:  At this time patient wishes to be DNR-CCA  Time Spent with Patient: >16 minutes      Electronically signed by Santa Shelby MD on 2021 at 8:06 PM  Thank you Alfonzo Linder MD for the opportunity to be involved in this patient's care.

## 2021-08-10 NOTE — CONSULTS
[facility] Orthopedic Surgery  Consult Note         This patient is seen in consultation at the request of Dr Stanislav Anaya MD    Reason for Consult:  Left pelvic ring pubic ramus minimally displaced fracture. CHIEF COMPLAINT:  Left hip pain/ fall. History Obtained From:  patient, family member - grand-daughter, electronic medical record    HISTORY OF PRESENT ILLNESS:    Ms. Ruslan Bourne y.o.  female seen today for consultation and evaluation of a left hip pain which occurred when she fell. She is complaining of left hip pain. This is better with rest and worse with bearing any wt. The pain is sharp and not radiating. No numbness or tingling sensation. No other complaint. She was seen 1st at [facility], came via EMS, where she was x-rayed and I was consulted. Past Medical History:        Diagnosis Date    Asthma     Hypertension     Osteoarthritis     Parkinson's disease ([facility] [address]        Past Surgical History:        Procedure Laterality Date    CATARACT REMOVAL         Medications prior to admission:   Prior to Admission medications    Medication Sig Start Date End Date Taking?  Authorizing Provider   albuterol sulfate HFA (VENTOLIN HFA) 108 (90 Base) MCG/ACT inhaler Inhale 2 puffs into the lungs every 6 hours as needed for Wheezing   Yes Historical Provider, MD   fluticasone-salmeterol (ADVAIR) 250-50 MCG/DOSE AEPB Inhale 1 puff into the lungs every 12 hours   Yes Historical Provider, MD   tiotropium (SPIRIVA RESPIMAT) 1.25 MCG/ACT AERS inhaler Inhale 2 puffs into the lungs daily    Yes Historical Provider, MD   carbidopa-levodopa (SINEMET CR)  MG per extended release tablet Take 1 tablet by mouth nightly   Yes Historical Provider, MD   montelukast (SINGULAIR) 10 MG tablet Take 10 mg by mouth nightly   Yes Historical Provider, MD   QUEtiapine (SEROQUEL) 25 MG tablet Take 75 mg by mouth nightly   Yes Historical Provider, MD   carbidopa-levodopa (SINEMET)  MG per tablet Take 2 tablets by mouth 2 times daily Give at 0700 and 1200   Yes Historical Provider, MD   carbidopa-levodopa (SINEMET)  MG per tablet Take 1.5 tablets by mouth Daily with supper Give at 1700   Yes Historical Provider, MD   losartan (COZAAR) 25 MG tablet Take 25 mg by mouth daily   Yes Historical Provider, MD   dicyclomine (BENTYL) 20 MG tablet Take 20 mg by mouth 2 times daily   Yes Historical Provider, MD   vitamin D (CHOLECALCIFEROL) 25 MCG (1000 UT) TABS tablet Take 1,000 Units by mouth daily   Yes Historical Provider, MD   vitamin B-12 (CYANOCOBALAMIN) 100 MCG tablet Take 100 mcg by mouth daily   Yes Historical Provider, MD   polyethylene glycol (GLYCOLAX) 17 g packet Take 17 g by mouth nightly as needed for Constipation   Yes Historical Provider, MD       Current Medications:   Current Facility-Administered Medications: tiotropium (SPIRIVA RESPIMAT) 2.5 MCG/ACT inhaler 2 puff, 2 puff, Inhalation, Daily  losartan (COZAAR) tablet 25 mg, 25 mg, Oral, Daily  melatonin tablet 3 mg, 3 mg, Oral, QPM  carbidopa-levodopa (SINEMET CR)  MG per extended release tablet 2 tablet, 2 tablet, Oral, Nightly  montelukast (SINGULAIR) tablet 10 mg, 10 mg, Oral, Nightly  QUEtiapine (SEROQUEL) tablet 75 mg, 75 mg, Oral, Nightly  carbidopa-levodopa (SINEMET)  MG per tablet 2 tablet, 2 tablet, Oral, BID WC  carbidopa-levodopa (SINEMET)  MG per tablet 1.5 tablet, 1.5 tablet, Oral, Dinner  dicyclomine (BENTYL) tablet 20 mg, 20 mg, Oral, BID PRN  vitamin D (CHOLECALCIFEROL) tablet 1,000 Units, 1,000 Units, Oral, Daily  vitamin B-12 (CYANOCOBALAMIN) tablet 100 mcg, 100 mcg, Oral, Daily  albuterol (PROVENTIL) nebulizer solution 2.5 mg, 2.5 mg, Nebulization, Q6H PRN  sodium chloride flush 0.9 % injection 5-40 mL, 5-40 mL, Intravenous, 2 times per day  sodium chloride flush 0.9 % injection 5-40 mL, 5-40 mL, Intravenous, PRN  0.9 % sodium chloride infusion, 25 mL, Intravenous, PRN  enoxaparin (LOVENOX) injection 30 mg, 30 mg, Subcutaneous, Daily  ondansetron (ZOFRAN-ODT) disintegrating tablet 4 mg, 4 mg, Oral, Q8H PRN **OR** ondansetron (ZOFRAN) injection 4 mg, 4 mg, Intravenous, Q6H PRN  polyethylene glycol (GLYCOLAX) packet 17 g, 17 g, Oral, Daily PRN  acetaminophen (TYLENOL) tablet 650 mg, 650 mg, Oral, Q6H PRN **OR** acetaminophen (TYLENOL) suppository 650 mg, 650 mg, Rectal, Q6H PRN  budesonide (PULMICORT) nebulizer suspension 500 mcg, 0.5 mg, Nebulization, BID **AND** Arformoterol Tartrate (BROVANA) nebulizer solution 15 mcg, 15 mcg, Nebulization, BID    Allergies: Other    Social History     Socioeconomic History    Marital status:      Spouse name: Not on file    Number of children: Not on file    Years of education: Not on file    Highest education level: Not on file   Occupational History    Not on file   Tobacco Use    Smoking status: Never Smoker    Smokeless tobacco: Never Used   Vaping Use    Vaping Use: Never used   Substance and Sexual Activity    Alcohol use: No    Drug use: No    Sexual activity: Not Currently   Other Topics Concern    Not on file   Social History Narrative    Not on file     Social Determinants of Health     Financial Resource Strain:     Difficulty of Paying Living Expenses:    Food Insecurity:     Worried About Running Out of Food in the Last Year:     [address] in the Last Year:    Transportation Needs:     Lack of Transportation (Medical):      Lack of Transportation (Non-Medical):    Physical Activity:     Days of Exercise per Week:     Minutes of Exercise per Session:    Stress:     Feeling of Stress :    Social Connections:     Frequency of Communication with Friends and Family:     Frequency of Social Gatherings with Friends and Family:     Attends [employer] Services:     Active Member of Clubs or Organizations:     Attends Club or Organization Meetings:     Marital Status:    Intimate Partner Violence:     Fear of Current or Ex-Partner:     Emotionally Abused:     Physically Abused:     Sexually Abused:        Family History:  Family History   Problem Relation Age of Onset    Other Other         TB       REVIEW OF SYSTEMS:   CONSTITUTIONAL: Denies unexplained weight loss, fevers, chills or fatigue  NEUROLOGICAL: Denies unsteady gait or progressive weakness    PSYCHOLOGICAL: Denies anxiety, depression   SKIN: Denies skin changes, delayed healing, rash, itching   HEMATOLOGIC: Denies easy bleeding or bruising  ENDOCRINE: Denies excessive thirst, urination, heat/cold  RESPIRATORY: Denies current dyspnea, cough  CARDIOVASCULAR: Negative for chest pain at this time. EYES: Negative for photophobia and visual disturbance. ENT:  Negative for rhinorrhea, epistaxis, sore throat, or hearing loss. GI: Denies nausea, vomiting, diarrhea   : Denies bowel or bladder issues   MUSCULOSKELETAL: Left hip pain  All other ROS reviewed in chart or with patient or family and are grossly negative. PHYSICAL EXAMINATION:  Ms. Naomie Simpson is a very pleasant 80 y.o. female who seen today in no acute distress, awake, alert, and oriented. She is well nourished and groomed. Patient with normal affect. Body mass index is 18.3 kg/m². . Skin warm and dry. Resting respiratory rate is 16. Resp deep and easy. Pulse is with regular rate and rhythm    BP (!) 143/79   Pulse 86   Temp 96.8 °F (36 °C)   Resp 16   Ht 5' (1.524 m)   Wt 93 lb 11.1 oz (42.5 kg)   SpO2 95%   BMI 18.30 kg/m²        Airway is intact  Chest: chest clear, no wheezing, rales, normal symmetric air entry, no tachypnea, retractions or cyanosis  Heart: regular rate and rhythm ; heart sounds normal   Hearing intact, pupil equal and reactive bilateral  Lymphatics; No groin or axillary enlarged lymph nodes. Neck; No swelling  Abdomen; soft, non distended.      MUSCULOSKELETAL:  Left leg in neutral rotation with pain with ROM, Examination of both lower extrimiteis showing a decreased range of motion and muscle power of the left hip compare to the other side because of pain. There is mild swelling that can be seen, and ecchymosis over the left hip, but no wound. She has intact sensation and good pedal pulses bilateral.  She has significant tenderness on deep palpation over the left hip. Good strength, and no instability both upper and the other lower extremities. NVI bilateral lower and upper extermities. NEUROLOGIC:   Sensory:    Touch:                     Right Upper Extremity:  normal                   Left Upper Extremity:  normal                  Right Lower Extremity:  normal                  Left Lower Extremity:  normal        DATA:    CBC:   Lab Results   Component Value Date    WBC 9.9 08/10/2021    RBC 3.95 08/10/2021    HGB 12.5 08/10/2021    HCT 37.2 08/10/2021    MCV 94.1 08/10/2021    MCH 31.8 08/10/2021    MCHC 33.8 08/10/2021    RDW 13.0 08/10/2021     08/10/2021    MPV 7.4 08/10/2021     WBC:    Lab Results   Component Value Date    WBC 9.9 08/10/2021     PT/INR:    Lab Results   Component Value Date    PROTIME 11.2 06/25/2018    INR 0.98 06/25/2018     PTT:    Lab Results   Component Value Date    APTT 28.9 05/30/2016   [APTT    IMAGING: CT scan and Xray's were reviewed, dated 8/9/2021,  AP pelvis and 2 views of the left hip, and showed a minimally displaced pubic ramus fracture. IMPRESSION: Left pelvic ring pubic ramus minimally displaced fracture. PLAN: I discussed with Severiano Byers and her grand daughter that the overall alignment of this fracture is good and that we can try to treat this non-operatively with a walker and 50% WB left leg for 6 weeks. We discussed the risk of nonunion and  malunion. We will see her  back in 4 weeks at which time we will get a new xray of the pelvis. Thank you very much for the kind consultation and allowing me to participate in this patient's care. I will continue to keep you apprised of her progress.         Lino Ross MD 8/10/2021  6:50 PM

## 2021-08-10 NOTE — CONSULTS
Comprehensive Nutrition Assessment    Type and Reason for Visit:  Initial, Positive Nutrition Screen    NUTRITION RECOMMENDATIONS:   1. PO Diet: Continue regular diet  2. ONS: Start magic cup BID    NUTRITION ASSESSMENT:  Nutritional summary & status: Pt +screen for low BMI and malnutrition screening tool indicating need for supplement recommendations. Pt reports no recent change in appetite or po intakes. Pts daughter at bedside reports pts appetite/intakes have slowly began to decrease over time but no recent significant changes. Pt voices that she does not enjoy Ensure supplements but is agreeable to trying Clement. RD to send to increase protein/calorie intakes. Limited actual wt hx per EMR - no significant losses noted. RD will monitor per [facility]. Patient admitted d/t fall, hip pain    PMH significant for: Parkinsonism, HTN    MALNUTRITION ASSESSMENT  Context of Malnutrition: Chronic Illness   Malnutrition Status: Mild malnutrition  Findings of the 6 clinical characteristics of malnutrition (Minimum of 2 out of 6 clinical characteristics is required to make the diagnosis of moderate or severe Protein Calorie Malnutrition based on AND/ASPEN Guidelines):  Energy Intake: No significant decrease in energy intake    Energy Intake Time: No significant    Weight Loss %: No significant loss   Weight loss Time: No significant    Body Fat Loss: Mild Loss    Body Fat Location: Triceps   Body Muscle Loss: Moderate Loss    Body Muscle Loss Location: Clavicles  and Shoulders   Fluid Accumulation: No significant    Fluid Accumulation Location: No significant     Strength: Not Performed; Not Measured       Estimated Daily Nutrient Needs:  Energy (kcal):  1298-3782 kcal/d (25-30 kcal/kg); Weight Used for Energy Requirements:  Current     Protein (g):  43-52 g/d (1.0-1.2 g/kg);  Weight Used for Protein Requirements:  Current        Fluid (ml/day):  7855-3978 ml/d; Method Used for Fluid Requirements:  1 ml/kcal OBJECTIVE DATA: Significant to nutrition assessment  · Nutrition-Focused Physical Findings: Nutrition Related Findings: no BM yet noted   · Labs: Reviewed  · Meds: Reviewed; vitamin D, vitamin b 12  · Wounds: Wound Type: None     CURRENT NUTRITION THERAPIES  ADULT DIET; Regular  Adult Oral Nutrition Supplement; Frozen Oral Supplement     PO Intake: None   PO Supplement Intake: None   IVF: n/a     ANTHROPOMETRICS  Current Height: 5' (152.4 cm)  Current Weight: 93 lb 11.1 oz (42.5 kg)    Admission weight: 93 lb 11.1 oz (42.5 kg)  Ideal Body Weight (lbs) (Calculated): 100 lbs (Ideal Body Weight (Kg) (Calculated): 45 kg)  Usual Bodyweight DAVONTE - limited wt hx   Weight Changes DAVONTE - no significant noted       BMI BMI (Calculated): 18.3    Wt Readings from Last 50 Encounters:   08/09/21 93 lb 11.1 oz (42.5 kg)   11/04/20 99 lb (44.9 kg)       Nutrition Diagnosis:   · Underweight related to inadequate protein-energy intake as evidenced by BMI      Nutrition Interventions:   Food and/or Nutrient Delivery:  Continue Current Diet, Start Oral Nutrition Supplement  Nutrition Education/Counseling:  No recommendation at this time   Coordination of Nutrition Care:  Continue to monitor while inpatient    Goals:  Pt will maintain adequate nutrition by consuming >50% of meals and supplements throuhgout admission.        Nutrition Monitoring and Evaluation:   Behavioral-Environmental Outcomes:  None Identified   Food/Nutrient Intake Outcomes:  Food and Nutrient Intake  Physical Signs/Symptoms Outcomes:  Biochemical Data, Weight     Ryan Rodriguez [address] Demi Sharper: [phone]  Office:  [phone]

## 2021-08-10 NOTE — PLAN OF CARE
Problem: Nutrition  Goal: Optimal nutrition therapy  Note: Nutrition Problem #1: Underweight  Intervention: Food and/or Nutrient Delivery: Continue Current Diet, Start Oral Nutrition Supplement  Nutritional Goals: Pt will maintain adequate nutrition by consuming >50% of meals and supplements throuhgout admission.

## 2021-08-10 NOTE — PROGRESS NOTES
Hospitalist Progress Note      PCP: Shannon Shepherd MD    Date of Admission: 8/9/2021    Subjective:   Pt reports pain on her sides. + weakness. Daughter at bedside reports pt has been getting progressively weaker at home. Has 24 hour care but can usually perform her own adls but now needing assistance. Daughter also reports that pt can have hallucinations and takes seroquel at night. Medications:  Reviewed    Infusion Medications    sodium chloride       Scheduled Medications    tiotropium  2 puff Inhalation Daily    carbidopa-levodopa  2 tablet Oral Nightly    montelukast  10 mg Oral Nightly    QUEtiapine  75 mg Oral Nightly    carbidopa-levodopa  2 tablet Oral BID WC    carbidopa-levodopa  1.5 tablet Oral Dinner    Vitamin D  1,000 Units Oral Daily    vitamin B-12  100 mcg Oral Daily    sodium chloride flush  5-40 mL Intravenous 2 times per day    enoxaparin  30 mg Subcutaneous Daily    budesonide  0.5 mg Nebulization BID    And    Arformoterol Tartrate  15 mcg Nebulization BID     PRN Meds: dicyclomine, albuterol, sodium chloride flush, sodium chloride, ondansetron **OR** ondansetron, polyethylene glycol, acetaminophen **OR** acetaminophen      Intake/Output Summary (Last 24 hours) at 8/10/2021 1004  Last data filed at 8/9/2021 1609  Gross per 24 hour   Intake --   Output 800 ml   Net -800 ml       Physical Exam Performed:    /71   Pulse 76   Temp 98.5 °F (36.9 °C) (Oral)   Resp 16   Ht 5' (1.524 m)   Wt 93 lb 11.1 oz (42.5 kg)   SpO2 95%   BMI 18.30 kg/m²     General appearance: No apparent distress, appears stated age and cooperative. HEENT:  Conjunctivae/corneas clear. Neck: Supple, with full range of motion. Respiratory:  Normal respiratory effort. Clear to auscultation  Cardiovascular: Regular rate and rhythm with normal S1/S2 without murmurs, rubs or gallops. Abdomen: Soft, non-tender, non-distended   Musculoskeletal: No clubbing, cyanosis or edema bilaterally. Skin: Skin color, texture, turgor normal.  No rashes or lesions. Neurologic:  grossly non-focal.  Psychiatric: Alert and oriented, thought content appropriate  Capillary Refill: Brisk,3 seconds, normal   Peripheral Pulses: +2 palpable, equal bilaterally       Labs:   Recent Labs     08/09/21  1559 08/10/21  0051 08/10/21  0554   WBC 15.8*  --  9.9   HGB 13.6 12.7 12.5   HCT 40.6 37.5 37.2     --  268     Recent Labs     08/09/21  1559 08/10/21  0554    141   K 4.3 4.0    109   CO2 28 20*   BUN 12 16   CREATININE <0.5* <0.5*   CALCIUM 9.4 8.7       Radiology:  XR CHEST PORTABLE   Final Result   Mild emphysema. No focal infiltrate is seen. Mild cardiomegaly without evidence of CHF. CT PELVIS WO CONTRAST Additional Contrast? None   Final Result      Acute comminuted fracture of the left pubic rami lateral to the symphysis. Vertically oriented fracture of the anterior right sacrum. Chronic, grade 1 anterolisthesis of L4 on L5, and degenerative changes of the posterior elements as described in the lower lumbar spine. Degenerative arthritis of the right hip joint greater than left. No acute fracture of the hips is seen. CT HIP LEFT WO CONTRAST   Final Result      Comminuted fracture of the anteromedial left superior pubic ramus. The left hip joint itself is intact. The sacrum is not included on this exam. Additional CT imaging of the pelvis is recommended to exclude an additional fracture. XR HIP 2-3 VW W PELVIS LEFT   Final Result      No fracture              Assessment/Plan:    Active Hospital Problems    Diagnosis     Hypovolemia [E86.1]     Pelvic ring fracture, closed, initial encounter ([facility] [address] [S32.810A]      Likely 2/2 progressive decline from parkinson's and advanced age. Ortho consulted for pelvic fracture eval though likely inoperable.   PT/OT consulted/ awaiting recs,  Cont pta Parkinson's meds/ cont seroquel and add melatonin for sundowning. DVT Prophylaxis: Lovenox  Diet: ADULT DIET; Regular  Code Status: DNR-CCA    PT/OT Eval Status: Pending ortho clearance    Dispo - > 2 midnight stay is necessary in this pt w/ mmp now presenting w/ falls 2/2 weakness and hypovolemia leading to pelvic fracture w/ inability to ambulate, at high risk for complications.       Tamera Varela MD

## 2021-08-10 NOTE — PROGRESS NOTES
Physical Therapy/Occupational therapy  HOLD    Orders received, chart reviewed. Pt with acute pubic rami fx. Ortho consult pending. Will hold therapy until ortho sees pt and clarifies WB status. Will discuss with RN.     Tashi Brown, PT, DPT  [phone]  Nicolle Amaro, OTR/L [id number]

## 2021-08-10 NOTE — PROGRESS NOTES
Pt alert and oriented x3-4 with intermittent confusion. Pt c/o some pain at beginning of shift and was administered PRN tylenol per MD orders. Pt vital signs stable during initial set of vitals. Pt found to be hypotensive, lethargic and difficult to arouse at 0000. MD was contacted on perfect serve. MD instructed this writer to call a rapid response. Rapid response was called. Pt received 1L IVF bolus and placed in trendelenburg. Pt BP's and mentation improved. Labs were drawn. Pt is up and oriented this morning, and appears to be back to baseline mentation. VSS since hypotensive episode. Pt repositioned for comfort. Pt able to tolerate PO fluids well.

## 2021-08-10 NOTE — CARE COORDINATION
Cm following, pt from home with family support pta. Will need Ortho to clear for PT OT to eval for DC recs. If SNF recs no precert needed. LM for dtr Kris Frias [phone] to discuss DC options and facility choices.    Electronically signed by Jorge Hernandez RN on 8/10/2021 at 3:11 PM  [phone]

## 2021-08-10 NOTE — SIGNIFICANT EVENT
Rapid response called to Rm [id number] due to pt becoming hypotensive at 0006 Hrs with BP: 76/48. Pt was placed in trendelenburg position. Pt was assessed, AOx3. CVS: Heart rate regular 74 bpm, rhythm regular. Chest was CTAB. IV fluids ordered and started. ECG done revealed NSR: 75 bpm     Blood pressure improved to 101/55 with 200 mls IVF infused. H/H, troponin, lactic acid and U/A ordered. To continue IVF blus and Will follow up labs.      Peter Leigh MD  Internal Medicine Resident, PGY-1

## 2021-08-11 PROCEDURE — 2580000003 HC RX 258: Performed by: INTERNAL MEDICINE

## 2021-08-11 PROCEDURE — 6360000002 HC RX W HCPCS: Performed by: INTERNAL MEDICINE

## 2021-08-11 PROCEDURE — 1200000000 HC SEMI PRIVATE

## 2021-08-11 PROCEDURE — 6370000000 HC RX 637 (ALT 250 FOR IP): Performed by: INTERNAL MEDICINE

## 2021-08-11 PROCEDURE — 97530 THERAPEUTIC ACTIVITIES: CPT

## 2021-08-11 PROCEDURE — 97167 OT EVAL HIGH COMPLEX 60 MIN: CPT

## 2021-08-11 PROCEDURE — 94640 AIRWAY INHALATION TREATMENT: CPT

## 2021-08-11 RX ORDER — MORPHINE SULFATE 2 MG/ML
1 INJECTION, SOLUTION INTRAMUSCULAR; INTRAVENOUS ONCE
Status: COMPLETED | OUTPATIENT
Start: 2021-08-11 | End: 2021-08-11

## 2021-08-11 RX ORDER — ACETAMINOPHEN 500 MG
1000 TABLET ORAL EVERY 8 HOURS SCHEDULED
Status: DISCONTINUED | OUTPATIENT
Start: 2021-08-11 | End: 2021-08-12 | Stop reason: HOSPADM

## 2021-08-11 RX ORDER — MORPHINE SULFATE 2 MG/ML
1 INJECTION, SOLUTION INTRAMUSCULAR; INTRAVENOUS EVERY 4 HOURS PRN
Status: DISCONTINUED | OUTPATIENT
Start: 2021-08-11 | End: 2021-08-12 | Stop reason: HOSPADM

## 2021-08-11 RX ADMIN — CARBIDOPA AND LEVODOPA 1.5 TABLET: 25; 100 TABLET ORAL at 17:52

## 2021-08-11 RX ADMIN — VITAM B12 100 MCG: 100 TAB at 12:39

## 2021-08-11 RX ADMIN — Medication 10 ML: at 10:00

## 2021-08-11 RX ADMIN — ACETAMINOPHEN 1000 MG: 500 TABLET, FILM COATED ORAL at 22:23

## 2021-08-11 RX ADMIN — Medication 3 MG: at 22:24

## 2021-08-11 RX ADMIN — MORPHINE SULFATE 1 MG: 2 INJECTION, SOLUTION INTRAMUSCULAR; INTRAVENOUS at 09:06

## 2021-08-11 RX ADMIN — Medication 10 ML: at 22:25

## 2021-08-11 RX ADMIN — CARBIDOPA AND LEVODOPA 2 TABLET: 25; 100 TABLET ORAL at 06:57

## 2021-08-11 RX ADMIN — ENOXAPARIN SODIUM 30 MG: 30 INJECTION SUBCUTANEOUS at 22:28

## 2021-08-11 RX ADMIN — Medication 1000 UNITS: at 12:39

## 2021-08-11 RX ADMIN — CARBIDOPA AND LEVODOPA 2 TABLET: 25; 100 TABLET, EXTENDED RELEASE ORAL at 00:13

## 2021-08-11 RX ADMIN — TIOTROPIUM BROMIDE INHALATION SPRAY 2 PUFF: 3.12 SPRAY, METERED RESPIRATORY (INHALATION) at 13:28

## 2021-08-11 RX ADMIN — QUETIAPINE FUMARATE 75 MG: 25 TABLET ORAL at 22:23

## 2021-08-11 RX ADMIN — MONTELUKAST 10 MG: 10 TABLET, FILM COATED ORAL at 22:24

## 2021-08-11 RX ADMIN — CARBIDOPA AND LEVODOPA 2 TABLET: 25; 100 TABLET ORAL at 12:38

## 2021-08-11 RX ADMIN — ALBUTEROL SULFATE 2.5 MG: 2.5 SOLUTION RESPIRATORY (INHALATION) at 13:26

## 2021-08-11 RX ADMIN — CARBIDOPA AND LEVODOPA 2 TABLET: 25; 100 TABLET, EXTENDED RELEASE ORAL at 22:28

## 2021-08-11 ASSESSMENT — PAIN SCALES - GENERAL: PAINLEVEL_OUTOF10: 5

## 2021-08-11 ASSESSMENT — PAIN DESCRIPTION - LOCATION: LOCATION: HIP

## 2021-08-11 ASSESSMENT — PAIN DESCRIPTION - ORIENTATION: ORIENTATION: INNER;LEFT

## 2021-08-11 NOTE — PROGRESS NOTES
Hospitalist Progress Note      PCP: Noel Barton MD    Date of Admission: 8/9/2021    Subjective:   Reports pain when bears weight. Unable to move w/o pain. Denies cp or sob. Received one dose of morphine this am that helped. Medications:  Reviewed    Infusion Medications    sodium chloride       Scheduled Medications    tiotropium  2 puff Inhalation Daily    losartan  25 mg Oral Daily    melatonin  3 mg Oral QPM    carbidopa-levodopa  2 tablet Oral Nightly    montelukast  10 mg Oral Nightly    QUEtiapine  75 mg Oral Nightly    carbidopa-levodopa  2 tablet Oral BID WC    carbidopa-levodopa  1.5 tablet Oral Dinner    Vitamin D  1,000 Units Oral Daily    vitamin B-12  100 mcg Oral Daily    sodium chloride flush  5-40 mL Intravenous 2 times per day    enoxaparin  30 mg Subcutaneous Daily    budesonide  0.5 mg Nebulization BID    And    Arformoterol Tartrate  15 mcg Nebulization BID     PRN Meds: dicyclomine, albuterol, sodium chloride flush, sodium chloride, ondansetron **OR** ondansetron, polyethylene glycol, acetaminophen **OR** acetaminophen      Intake/Output Summary (Last 24 hours) at 8/11/2021 1405  Last data filed at 8/11/2021 1105  Gross per 24 hour   Intake 370 ml   Output 850 ml   Net -480 ml       Physical Exam Performed:    /70   Pulse 84   Temp 98.1 °F (36.7 °C) (Oral)   Resp 16   Ht 5' (1.524 m)   Wt 93 lb 11.1 oz (42.5 kg)   SpO2 95%   BMI 18.30 kg/m²     General appearance: mild pain distress  HEENT:  Conjunctivae/corneas clear. Neck: Supple, with full range of motion. Respiratory:  Normal respiratory effort. Clear to auscultation  Cardiovascular: Regular rate and rhythm  Abdomen: Soft, non-tender, non-distended   Musculoskeletal: no le edema b/l  Skin:   No rashes or lesions.   Neurologic:  grossly non-focal.  Capillary Refill: Brisk,3 seconds, normal   Peripheral Pulses: +2 palpable, equal bilaterally       Labs:   Recent Labs     08/09/21  1559 08/10/21  0051 08/10/21  0554   WBC 15.8*  --  9.9   HGB 13.6 12.7 12.5   HCT 40.6 37.5 37.2     --  268     Recent Labs     08/09/21  1559 08/10/21  0554    141   K 4.3 4.0    109   CO2 28 20*   BUN 12 16   CREATININE <0.5* <0.5*   CALCIUM 9.4 8.7         Assessment/Plan:    Active Hospital Problems    Diagnosis     Hypovolemia [E86.1]     Mild malnutrition (HCC) [E44.1]     Multiple closed fractures of pelvis with stable disruption of pelvic [place] ([facility] [place] [address] [S32.810A]      Pelvic fracture:  Evaluated by ortho- recommend 50% wb. Appreciate recs. Add morphine prn and schedule tylenol for better pain control/ awaiting pt/ot evals. Falls:  Likely 2/2 progressive decline from parkinson's and advanced age. Parkinson's:  W/ sundowning and delirium. Cont pta Parkinson's meds/ cont seroquel and melatonin for sundowning. DVT Prophylaxis: Lovenox  Diet: ADULT DIET; Regular  Adult Oral Nutrition Supplement; Frozen Oral Supplement  Code Status: DNR-CCA    PT/OT Eval Status: Pending ortho clearance    Dispo - inpatient for now.       Francisco Gonzales MD

## 2021-08-11 NOTE — PROGRESS NOTES
Pt has been alert but more confused throughout shift. Pt screams out when in room alone. Video safety monitoring put into pt room during shift for increased safety. Pt focused on getting up and requesting to leave. Pt has required increased need for explanation of why she is in the hospital.  Pt has had poor sleep throughout shift. VSS, but pt remains weak. Pt lost 2 IV peripheral accesses d/t occlusion. New peripheral placed. Pt is currently in bed resting with eyes closed.

## 2021-08-11 NOTE — CARE COORDINATION
Cm following, spoke with dtr Zee Flood [phone] SNF choices given and referrals sent. Discussion about the difference between [facility] acute rehab vs SNF rehab and at this time Dtr feels SNF rehab more appropriate but waiting on PT OT evals for DC recs. Pt with significant pain with moving at this time. Ortho following.   Electronically signed by Michele Guerrero RN on 8/11/2021 at 1:50 PM  [phone]

## 2021-08-11 NOTE — PROGRESS NOTES
Patient is A&O to person. VSS. Patient is a x2 to transfer. Patient has pain in LLE when being moved. Patient has Leocadia Lei in place. Fall precautions in place and call light within reach. Will continue to monitor.

## 2021-08-11 NOTE — PROGRESS NOTES
Occupational/Physical Therapy    Hold    OT/PT orders received. Chart reviewed. Ortho consult still pending. Discussed with RN, stated she would look into it. Hold OT/PT pending ortho consult. Will check back later today vs tomorrow as schedule permits. Ren Smith S/OT  Jumana Cunningham OTR/L [id number]    Addendum: spoke with RN at [phone]. Unsure if ortho has been in to room yet. Will f/u once ortho sees pt. RN aware.     [facility], PT, DPT  [id number]

## 2021-08-11 NOTE — PROGRESS NOTES
Occupational Therapy   Occupational Therapy Initial Assessment/tx  Date: 2021   Patient Name: Finesse Andrews  MRN: [id number]     : 1926    Date of Service: 2021    Discharge Recommendations:  Finesse Andrews scored a 14/24 on the AM-PAC ADL Inpatient form. Current research shows that an AM-PAC score of 17 or less is typically not associated with a discharge to the patient's home setting. Based on the patient's AM-PAC score and their current ADL deficits, it is recommended that the patient have 3-5 sessions per week of Occupational Therapy at d/c to increase the patient's independence. Please see assessment section for further patient specific details. If patient discharges prior to next session this note will serve as a discharge summary. Please see below for the latest assessment towards goals. OT Equipment Recommendations  Other: defer to next level of care    Assessment   Performance deficits / Impairments: Decreased functional mobility ; Decreased ADL status; Decreased ROM; Decreased endurance  Assessment: Pt admitted with fall, s/p pubic rami fx. She has significant pain with mobility and is functioning well below baseline level. Currently, she is dep of 2 with bed mobility and scooting, and max A of 1 to transfer. Recommend ongoing inpatient OT at discharge to maximize functional status. Treatment Diagnosis: impaired ADLs and transfers  Prognosis: Good  Decision Making: High Complexity  OT Education: OT Role;Plan of Care;Transfer Training  Patient Education: needs reinforcement  REQUIRES OT FOLLOW UP: Yes  Activity Tolerance  Activity Tolerance: Patient limited by pain  Safety Devices  Safety Devices in place: Yes  Type of devices: Left in chair;Nurse notified;Call light within reach; Chair alarm in place           Patient Diagnosis(es): The encounter diagnosis was Multiple closed fractures of pelvis with stable disruption of pelvic ring, initial encounter ([facility] [address]      has a past medical history of Asthma, Hypertension, Osteoarthritis, and Parkinson's disease ([address] has a past surgical history that includes Cataract removal.    Treatment Diagnosis: impaired ADLs and transfers      Restrictions  Position Activity Restriction  Other position/activity restrictions: up as tolerated,  per ortho consult:  50% WB on L LE with walker for 6 wks    Subjective   General  Chart Reviewed: Yes  Additional Pertinent Hx: PMH:  Parkinson's, HTN, depression  Family / Caregiver Present: Yes (granddaughter)  Referring Practitioner: Dr. Cristel Lopez  Diagnosis: Pt admitted after fall, sustaining L pubic rami fx-CXR=mild emphysema, midl cardiomegaly, pelvis CT=acute comminuted fx L pubic rami, L hip XR=neg-ortho consult -nonoperative tx walker with 50% WB L LE for 6 wks  Subjective  Subjective: Pt in bed, agreeable to work with OT.   Patient Currently in Pain: No  Vital Signs  Patient Currently in Pain: No  Social/Functional History  Social/Functional History  Lives With: Alone, Other (comment) (Pt has 24hr care with family and home health nurse)  Type of Home: House  Home Layout: One level  Home Access: Stairs to enter with rails  Entrance Stairs - Number of Steps: 1  Bathroom Shower/Tub: Walk-in shower, Shower chair with back, Tub/Shower unit (Pt uses walk in shower with shower chair)  Bathroom Toilet: Standard  Bathroom Equipment: Grab bars around toilet (grab abrs around toilet in bathroom with tub/shower unit)  Bathroom Accessibility: Accessible (Pt unable to bring walker into bathroom)  Home Equipment: 4 wheeled walker, Cane, Lift chair  Receives Help From: Home health, Family  ADL Assistance: Needs assistance (Family reports pt is able to complete most ADLs independently but requires occasional assistance)  Homemaking Assistance:  (family does cooking and cleaning)  Homemaking Responsibilities: No  Ambulation Assistance: Independent (with rollator)  Transfer Assistance: Independent  Active : No  Occupation: Retired       Objective        Orientation  Overall Orientation Status: Impaired  Orientation Level: Oriented to place; Disoriented to time;Disoriented to situation;Oriented to person (Pt's virgil reports pt is disoriented to time at baseline)     Toilet Transfers  Toilet - Technique: Stand pivot (to R)  Equipment Used:  (simulated 3 in 1 commode)  Toilet Transfer: Maximum assistance  ADL  LE Dressing: Dependent/Total (dep donning B hospital socks)        Bed mobility  Supine to Sit: Dependent/Total (dep of 2)  Scooting: Dependent/Total (dep of 2)  Comment: During bed  mobility and scooting, pt yelling out in pain-Rn notified at end of session        Cognition  Overall Cognitive Status: Exceptions  Arousal/Alertness: Delayed responses to stimuli  Following Commands: Follows one step commands with increased time; Follows one step commands with repetition  Memory: Decreased recall of recent events;Decreased short term memory  Problem Solving: Assistance required to generate solutions  Insights: Not aware of deficits  Initiation: Requires cues for all  Sequencing: Requires cues for all                 LUE AROM (degrees)  LUE General AROM: ~ 80 shoulder flex, elbow to hand=WFL  Left Hand AROM (degrees)  Left Hand AROM: WFL  Left Hand General AROM: ~75 shoulder flex, elbow to hand=WFL  Right Hand AROM (degrees)  Right Hand AROM: WFL  LUE Strength  LUE Strength Comment: 3-/5 shoulder  RUE Strength  RUE Strength Comment: 3-/5 shoulder     Hand Dominance  Hand Dominance: Right             Plan   Plan  Times per week: 2-5  Current Treatment Recommendations: ROM, Balance Training, Functional Mobility Training, Endurance Training, Self-Care / ADL    G-Code     OutComes Score                                                  AM-PAC Score        AM-[facility] Inpatient Daily Activity Raw Score: 14 (08/11/21 1436)  AM-PAC Inpatient ADL T-Scale Score : 33.39 (08/11/21 1436)  ADL Inpatient CMS 0-100% Score: 59.67

## 2021-08-11 NOTE — PLAN OF CARE
Problem: Skin Integrity:  Goal: Absence of new skin breakdown  Description: Absence of new skin breakdown  Outcome: Ongoing  Note: Skin is intact and no new redness noted. Patient turned and repositioned in bed using pillow and purple foam wedge. Problem: Falls - Risk of:  Goal: Will remain free from falls  Description: Will remain free from falls  Outcome: Ongoing  Note: Bed in lowest setting. Call light within reach. Bed alarm on. A&O x1. Nurse rounding on patient frequently for visual checks. Avasys in room.

## 2021-08-12 VITALS
OXYGEN SATURATION: 98 % | RESPIRATION RATE: 16 BRPM | TEMPERATURE: 97.9 F | SYSTOLIC BLOOD PRESSURE: 109 MMHG | HEART RATE: 77 BPM | BODY MASS INDEX: 18.4 KG/M2 | HEIGHT: 60 IN | WEIGHT: 93.7 LBS | DIASTOLIC BLOOD PRESSURE: 63 MMHG

## 2021-08-12 PROCEDURE — 97162 PT EVAL MOD COMPLEX 30 MIN: CPT

## 2021-08-12 PROCEDURE — 6370000000 HC RX 637 (ALT 250 FOR IP): Performed by: INTERNAL MEDICINE

## 2021-08-12 PROCEDURE — 6360000002 HC RX W HCPCS: Performed by: INTERNAL MEDICINE

## 2021-08-12 PROCEDURE — 97530 THERAPEUTIC ACTIVITIES: CPT

## 2021-08-12 PROCEDURE — 94640 AIRWAY INHALATION TREATMENT: CPT

## 2021-08-12 PROCEDURE — 2580000003 HC RX 258: Performed by: INTERNAL MEDICINE

## 2021-08-12 RX ORDER — CYCLOBENZAPRINE HCL 5 MG
5 TABLET ORAL 2 TIMES DAILY PRN
Qty: 10 TABLET | Refills: 0
Start: 2021-08-12 | End: 2021-08-22

## 2021-08-12 RX ADMIN — BUDESONIDE 500 MCG: 0.5 SUSPENSION RESPIRATORY (INHALATION) at 11:21

## 2021-08-12 RX ADMIN — Medication 5 ML: at 07:46

## 2021-08-12 RX ADMIN — MORPHINE SULFATE 1 MG: 2 INJECTION, SOLUTION INTRAMUSCULAR; INTRAVENOUS at 16:28

## 2021-08-12 RX ADMIN — ARFORMOTEROL TARTRATE 15 MCG: 15 SOLUTION RESPIRATORY (INHALATION) at 11:21

## 2021-08-12 RX ADMIN — VITAM B12 100 MCG: 100 TAB at 09:24

## 2021-08-12 RX ADMIN — CARBIDOPA AND LEVODOPA 2 TABLET: 25; 100 TABLET ORAL at 07:00

## 2021-08-12 RX ADMIN — Medication 1000 UNITS: at 09:24

## 2021-08-12 RX ADMIN — MORPHINE SULFATE 1 MG: 2 INJECTION, SOLUTION INTRAMUSCULAR; INTRAVENOUS at 08:44

## 2021-08-12 RX ADMIN — ACETAMINOPHEN 1000 MG: 500 TABLET, FILM COATED ORAL at 07:00

## 2021-08-12 RX ADMIN — TIOTROPIUM BROMIDE INHALATION SPRAY 2 PUFF: 3.12 SPRAY, METERED RESPIRATORY (INHALATION) at 11:22

## 2021-08-12 RX ADMIN — CARBIDOPA AND LEVODOPA 2 TABLET: 25; 100 TABLET ORAL at 12:17

## 2021-08-12 ASSESSMENT — PAIN SCALES - GENERAL
PAINLEVEL_OUTOF10: 9
PAINLEVEL_OUTOF10: 0
PAINLEVEL_OUTOF10: 3
PAINLEVEL_OUTOF10: 4
PAINLEVEL_OUTOF10: 5

## 2021-08-12 ASSESSMENT — PAIN SCALES - PAIN ASSESSMENT IN ADVANCED DEMENTIA (PAINAD)
BREATHING: 1
NEGVOCALIZATION: 0
BODYLANGUAGE: 0
TOTALSCORE: 1
CONSOLABILITY: 0
BODYLANGUAGE: 2
CONSOLABILITY: 2
BREATHING: 1
NEGVOCALIZATION: 2
FACIALEXPRESSION: 2
TOTALSCORE: 9
FACIALEXPRESSION: 0

## 2021-08-12 NOTE — DISCHARGE SUMMARY
Hospital Medicine Discharge Summary    Patient ID: Shawn Aguirre      Patient's PCP: Cuong Herrera MD    Admit Date: 8/9/2021     Discharge Date:   8/12/2021    Admitting Physician: Kingston Baumann MD     Discharge Physician: Jordis Goldberg, MD     Discharge Diagnoses: Active Hospital Problems    Diagnosis     Hypovolemia [E86.1]     Mild malnutrition ([address] [E44.1]     Multiple closed fractures of pelvis with stable disruption of pelvic [place] ([address] [S32.810A]        The patient was seen and examined on day of discharge and this discharge summary is in conjunction with any daily progress note from day of discharge. Hospital Course:   Pelvic fracture:  Evaluated by ortho- recommend 50% wb. Appreciate recs. Cont scheduled tylenol for better pain control/ place on prn flexeril. PT/OT.     Falls:  Likely 2/2 progressive decline from parkinson's and advanced age.     Parkinson's:  W/ sundowning and delirium. Cont pta Parkinson's meds/ cont seroquel and melatonin for sundowning. Physical Exam Performed:     /73   Pulse 82   Temp 97.7 °F (36.5 °C) (Oral)   Resp 16   Ht 5' (1.524 m)   Wt 93 lb 11.1 oz (42.5 kg)   SpO2 98%   BMI 18.30 kg/m²       General appearance:  No apparent distress  HEENT:  Normal cephalic, atraumatic without obvious deformity. Neck: Supple, with full range of motion. No jugular venous distention. Trachea midline. Respiratory:  Normal respiratory effort. Clear to auscultation  Cardiovascular:  Regular rate and rhythm   Abdomen: Soft, non-tender, non-distended  Musculoskeletal:  No clubbing, cyanosis or edema bilaterally. Skin: Skin color, texture, turgor normal  Neurologic:  Neurovascularly intact without any focal sensory/motor deficits. Psychiatric:  Alert and oriented, thought content appropriate, normal insight      Labs:  For convenience and continuity at follow-up the following most recent labs are provided:    CBC:    Lab Results   Component Value 1.25 MCG/ACT AERS inhaler Inhale 2 puffs into the lungs daily       carbidopa-levodopa (SINEMET CR)  MG per extended release tablet Take 1 tablet by mouth nightly      montelukast (SINGULAIR) 10 MG tablet Take 10 mg by mouth nightly      QUEtiapine (SEROQUEL) 25 MG tablet Take 75 mg by mouth nightly      !! carbidopa-levodopa (SINEMET)  MG per tablet Take 2 tablets by mouth 2 times daily Give at 0700 and 1200      !! carbidopa-levodopa (SINEMET)  MG per tablet Take 1.5 tablets by mouth Daily with supper Give at 1700      losartan (COZAAR) 25 MG tablet Take 25 mg by mouth daily      dicyclomine (BENTYL) 20 MG tablet Take 20 mg by mouth 2 times daily      vitamin D (CHOLECALCIFEROL) 25 MCG (1000 UT) TABS tablet Take 1,000 Units by mouth daily      vitamin B-12 (CYANOCOBALAMIN) 100 MCG tablet Take 100 mcg by mouth daily      polyethylene glycol (GLYCOLAX) 17 g packet Take 17 g by mouth nightly as needed for Constipation       !! - Potential duplicate medications found. Please discuss with provider. Time Spent on discharge is more than 20 minutes in the examination, evaluation, counseling and review of medications and discharge plan. Signed:    Lula Dean MD   8/12/2021      Thank you Pamela Simental MD for the opportunity to be involved in this patient's care.

## 2021-08-12 NOTE — PLAN OF CARE
Problem: Falls - Risk of:  Goal: Will remain free from falls  Description: Will remain free from falls  8/12/2021 1650 by Syeda Rees RN  Outcome: Completed  8/12/2021 0411 by Terri Geiger RN  Outcome: Ongoing  Goal: Absence of physical injury  Description: Absence of physical injury  Outcome: Completed     Problem: Skin Integrity:  Goal: Will show no infection signs and symptoms  Description: Will show no infection signs and symptoms  8/12/2021 1650 by Syeda Rees RN  Outcome: Completed  8/12/2021 0411 by Terri Geiger RN  Outcome: Ongoing  Goal: Absence of new skin breakdown  Description: Absence of new skin breakdown  Outcome: Completed     Problem: Nutrition  Goal: Optimal nutrition therapy  Outcome: Completed     Problem: Pain:  Goal: Pain level will decrease  Description: Pain level will decrease  Outcome: Completed  Goal: Control of acute pain  Description: Control of acute pain  Outcome: Completed  Goal: Control of chronic pain  Description: Control of chronic pain  Outcome: Completed

## 2021-08-12 NOTE — PLAN OF CARE
Problem: Falls - Risk of:  Goal: Will remain free from falls  Description: Will remain free from falls  Outcome: Ongoing   Camera monitored, bed alarm in use.  Patient moving in bed but no attempts to get out thus far

## 2021-08-12 NOTE — PROGRESS NOTES
Physical Therapy    Facility/Department: [facility] [address]  Initial Assessment/Treatment    NAME: Hawa Barnett  : 1926  MRN: [id number]    Date of Service: 2021    Discharge Recommendations:    Hawa Barnett scored a 6/24 on the AM-PAC short mobility form. Current research shows that an AM-PAC score of 17 or less is typically not associated with a discharge to the patient's home setting. Based on the patient's AM-PAC score and their current functional mobility deficits, it is recommended that the patient have 3-5 sessions per week of Physical Therapy at d/c to increase the patient's independence. Please see assessment section for further patient specific details. If patient discharges prior to next session this note will serve as a discharge summary. Please see below for the latest assessment towards goals. PT Equipment Recommendations  Equipment Needed:  (Defer to next level of care)    Assessment   Body structures, Functions, Activity limitations: Decreased functional mobility ; Decreased strength;Decreased balance; Increased pain;Decreased endurance  Assessment: Pt is a 80 y.o. F with a pubic rami fx. Today she demos functional mobility w/ dep A x 2. Pt presented well below her functional baseline as she is normally able to ambulate household distances with rollator independently. Her daughter indicates that she would be unable to safely care for her if she is d/c'd home. Pt would benefit from further skilled IP PT to increase activity tolerance and functional mobility. Will follow. Treatment Diagnosis: decreased functional mobility d/t fx  Prognosis: Fair  Decision Making: Medium Complexity  PT Education: PT Role;General Safety;Weight-bearing Education  Patient Education: Pt was educated on PT role, WBing status (needs renforcement), and to call for nurse when she needs to get up. - verbalized understanding.   REQUIRES PT FOLLOW UP: Yes  Activity Tolerance  Activity Tolerance: Patient limited by pain       Patient Diagnosis(es): The encounter diagnosis was Multiple closed fractures of pelvis with stable disruption of pelvic ring, initial encounter ([facility] [address] has a past medical history of Asthma, Hypertension, Osteoarthritis, and Parkinson's disease ([facility] [address] has a past surgical history that includes Cataract removal.    Restrictions  Position Activity Restriction  Other position/activity restrictions: up as tolerated,  per ortho consult:  50% WB on L LE with walker for 6 wks  Vision/Hearing  Vision: Impaired  Vision Exceptions: Wears glasses for reading  Hearing: Within functional limits     Subjective  General  Chart Reviewed: Yes  Patient assessed for rehabilitation services?: Yes  Additional Pertinent Hx: 77-year-old female with PMHx of PD, HTN, and depression presented to the ED w/ c/o L groin/thigh pain. XR Hip: (-); CT hip: Comminuted fracture of the anteromedial left superior pubic ramus; CT pelvis: Acute comminuted fx L pubic rami lateral to the symphysis. Vertically oriented fx of ant right sacrum. Chronic, grade 1 anterolisthesis of L4 on L5;CXR: mild cardiomegaly and emphysema. Family / Caregiver Present: Yes (Dtr)  Referring Practitioner: [facility], MD  Referral Date : 21  Diagnosis: Pelvic ring fracture, closed  Follows Commands: Impaired (Follows direct 1 step commands)  Subjective  Subjective: Pt found supine in bed. Pt is agreeable to PT. Pain Screening  Patient Currently in Pain: Yes (Pain in L groin - not rated - RN notified and administered pain meds.)  Vital Signs  Patient Currently in Pain: Yes (Pain in L groin - not rated - RN notified and administered pain meds.)     Orientation  Orientation  Overall Orientation Status: Impaired (Oriented to self and .  Disoriented to year and place.)  Social/Functional History  Social/Functional History  Lives With: Alone, Other (comment) (Pt has 24hr care with family and home health nurse)  Type of Home: [facility] Layout: One level  Home Access: Stairs to enter with rails  Entrance Stairs - Number of Steps: 1  Bathroom Shower/Tub: Walk-in shower, Shower chair with back, Tub/Shower unit (Pt uses walk in shower with shower chair)  Bathroom Toilet: Standard  Bathroom Equipment: Grab bars around toilet (grab abrs around toilet in bathroom with tub/shower unit)  Bathroom Accessibility: Accessible (Pt unable to bring walker into bathroom)  Home Equipment: 4 wheeled walker, Cane, Lift chair  Receives Help From: Home health, Family  ADL Assistance: Needs assistance (Family reports pt is able to complete most ADLs independently but requires occasional assistance)  Homemaking Assistance:  (family does cooking and cleaning)  Homemaking Responsibilities: No  Ambulation Assistance: Independent (with rollator)  Transfer Assistance: Independent  Active : No  Occupation: Retired    Objective   Observation: Involuntary writhing movements of head d/t PD medication. Dtr states this is normal for her if she's had her medication. AROM RLE (degrees)  RLE AROM: WFL  AROM LLE (degrees)  LLE General AROM: Limited throughout d/t pain. Strength RLE  Comment: grossly weak throughout  Strength LLE  Comment: grossly weak throughout        Bed mobility  Supine to Sit: Dependent/Total (Dep A x 2)  Scooting: Dependent/Total (Dep A x 2)  Transfers  Squat Pivot Transfers: Dependent/Total (Dep A x 2 from EOB to commode; Dep A x 2 from commode to chair)  Ambulation  Ambulation?:  (Not assessed d/t pain, safety concerns, and pt's limited ability to demostrate WBing status)     Balance  Sitting - Static:  (CGA-min A - pt sat at EOB for 5 min and demos leaning to the R d/t pain and involuntary movements of head)  Sitting - Dynamic:  (CGA)      Treatment included functional mobility training.     Plan   Plan  Times per week: 2-5  Current Treatment Recommendations: Strengthening, Transfer Training, Endurance Training, Patient/Caregiver Education &

## 2021-08-12 NOTE — CARE COORDINATION
Case Management Assessment            Discharge Note                    Date / Time of Note: 8/12/2021 12:14 PM                  Discharge Note Completed by: Yesika Witt RN    Patient Name: Tamera Lomax   YOB: 1926  Diagnosis: Pelvic ring fracture, closed, initial encounter ([facility] [address] Lyndsey Lovett  Multiple closed fractures of pelvis with stable disruption of pelvic ring, initial encounter ([facility] [address] [S32.810A]  Hypovolemia [E86.1]   Date / Time: 8/9/2021  8:43 AM    Current PCP: Elisha Montes MD  Clinic patient: No    Hospitalization in the last 30 days: No    Advance Directives:  Code Status: DNR-CCA  [facility] DNR form completed and on chart: Yes    Financial:  Payor: MEDICARE / Plan: MEDICARE PART A AND B / Product Type: *No Product type* /      Pharmacy:    [address] - P [phone] [facility] [phone]  [address]  [address] [place], [place] [id number]  Phone: [phone] Fax: [phone]    [address] [place] [address] [phone] -  [phone]  [address] [place] [address]  Phone: [phone] Fax: [phone]      Assistance purchasing medications?:    Assistance provided by Case Management: None at this time    Does patient want to participate in local refill/ meds to beds program?:      Meds To Beds General Rules:  1. Can ONLY be done Monday- Friday between 8:30am-5pm  2. Prescription(s) must be in pharmacy by 3pm to be filled same day  3. Copy of patient's insurance/ prescription drug card and patient face sheet must be sent along with the prescription(s)  4. Cost of Rx cannot be added to hospital bill. If financial assistance is needed, please contact unit  or ;  or  CANNOT provide pharmacy voucher for patients co-pays  5.  Patients can then  the prescription on their way out of the hospital at discharge, or pharmacy can deliver to the bedside if staff is available. (payment due at time of pick-up or delivery - cash, check, or card accepted)     Able to afford home medications/ co-pay costs: Yes    ADLS:  Current PT AM-PAC Score: 6 /24  Current OT AM-PAC Score: 14 /24      DISCHARGE Disposition: [place] (SNF): [place] Phone: [phone] Fax: [phone]    LOC at discharge: Skilled  NAVEEN Completed: Yes    Notification completed in HENS/PAS?:  Yes : CM has completed HENS online through secure website for SNF admission [facility] . Document ID #: [id number]    IMM Completed:   Not Indicated    Transportation:  Transportation PLAN for discharge: EMS transportation   Mode of Transport: Ambulance stretcher - BLS  Reason for medical transport: Other: pubic rami fx pain parkinson  Name of Transport Company: [employer]  Phone: [phone]  Time of Transport: 4p    Transport form completed: Yes    Home Care: Additional CM Notes: Pt from home will DC to [facility] today under Covid 3 midnight waiver, Sherice Merino able to take today. [facility] care to transport at [facility]. Nurse to call report to [phone] orders faxed to [phone]. Dtr Demetria Gregg aware [phone] of DC plans today. The Plan for Transition of Care is related to the following treatment goals of Pelvic ring fracture, closed, initial encounter ([facility] [address] [S32.810A]  Multiple closed fractures of pelvis with stable disruption of pelvic ring, initial encounter ([facility] [address] [S32.810A]  Hypovolemia [E86.1]    The Patient and/or patient representative Prem Kenny and her family were provided with a choice of provider and agrees with the discharge plan Yes    Freedom of choice list was provided with basic dialogue that supports the patient's individualized plan of care/goals and shares the quality data associated with the providers.  Yes    Care Transitions patient: No    Thom Garcia RN  [facility]  Case Management Department  Ph: [phone]  Fax: [phone]

## 2021-08-12 NOTE — PROGRESS NOTES
Pt's daughter called concerning administration times of sinemet. Pharmacy called, times adjusted to 0630, 1200, 1700, 2100.

## 2021-08-12 NOTE — PROGRESS NOTES
Patient being discharged to [facility]. All belongings sent with patient. Report called and given to Dominique; all questions answered and callback number given in case questions arise. PRN Morphine given per family request for ride. PIV removed. Patient transferred over to stretcher and packet given to EMS squad.

## 2021-08-12 NOTE — DISCHARGE INSTR - COC
Assessment:  Last Vital Signs: /73   Pulse 82   Temp 97.7 °F (36.5 °C) (Oral)   Resp 16   Ht 5' (1.524 m)   Wt 93 lb 11.1 oz (42.5 kg)   SpO2 98%   BMI 18.30 kg/m²     Last documented pain score (0-10 scale): Pain Level: 3  Last Weight:   Wt Readings from Last 1 Encounters:   08/09/21 93 lb 11.1 oz (42.5 kg)     Mental Status:  disoriented and alert    IV Access:  - None    Nursing Mobility/ADLs:  Walking   Assisted  Transfer  Assisted  Bathing  Assisted  Dressing  Assisted  Toileting  Assisted  Feeding  Assisted  Med Admin  Assisted  Med Delivery   whole    Wound Care Documentation and Therapy:        Elimination:  Continence:   · Bowel: {YES / UV:52247}  · Bladder: {YES / DENZEL:42395}  Urinary Catheter: None   Colostomy/Ileostomy/Ileal Conduit: {YES / CT:90567}       Date of Last BM: ***    Intake/Output Summary (Last 24 hours) at 8/12/2021 1059  Last data filed at 8/12/2021 0951  Gross per 24 hour   Intake 870 ml   Output 700 ml   Net 170 ml     I/O last 3 completed shifts: In: 5 [P.O.:720]  Out: 700 [Urine:700]    Safety Concerns:     Sundowners [place], History of Falls (last 30 days) and At Risk for Falls    Impairments/Disabilities:      None    Nutrition Therapy:  Current Nutrition Therapy:   - Oral Diet:  General    Routes of Feeding: Oral  Liquids: {Slp liquid thickness:26990}  Daily Fluid Restriction: no  Last Modified Barium Swallow with Video (Video Swallowing Test): not done    Treatments at the Time of Hospital Discharge:   Respiratory Treatments: ***  Oxygen Therapy:  is not on home oxygen therapy.   Ventilator:    - No ventilator support    Rehab Therapies: {THERAPEUTIC INTERVENTION:2288831975}  Weight Bearing Status/Restrictions: Partial weight bearing (30-50%) only on leg left leg  Other Medical Equipment (for information only, NOT a DME order):  {EQUIPMENT:353546042}  Other Treatments: ***    Patient's personal belongings (please select all that are sent with patient):  Best VICTORIA SIGNATURE:  Electronically signed by Shannan Day RN on 8/12/21 at 1:30 PM EDT    CASE MANAGEMENT/SOCIAL WORK SECTION    Inpatient Status Date: ***    Readmission Risk Assessment Score:  Readmission Risk              Risk of Unplanned Readmission:  15           Discharging to Facility/ [address] [facility] [address]       Phone: [phone]       Fax: [phone]        ·     / signature: Electronically signed by Thom Garcia RN on 8/12/21 at 10:59 AM EDT    PHYSICIAN SECTION    Prognosis: Fair    Condition at Discharge: Stable    Rehab Potential (if transferring to Rehab): Fair    Recommended Labs or Other Treatments After Discharge: PT OT evals and treat    Physician Certification: I certify the above information and transfer of Michael Melendez  is necessary for the continuing treatment of the diagnosis listed and that she requires [place] for less 30 days.      Update Admission H&P: No change in H&P    PHYSICIAN SIGNATURE:  Lula Dean MD

## 2021-08-12 NOTE — PROGRESS NOTES
Oriented to self. Assisted to turn, reposition during the night. Pain with movement but otherwise seems comfortable. Purewick in use. Offered patient bedpan but declined at that time. Took pills with water. Slept at stretches between care during the shift thus far.

## 2021-08-14 LAB
BLOOD CULTURE, ROUTINE: NORMAL
CULTURE, BLOOD 2: NORMAL

## 2021-08-20 NOTE — PROGRESS NOTES
Physician Progress Note      PATIENT:               Jose Angel Samuel  CSN #:                  [id number]  :                       [id number]  ADMIT DATE:       2021 8:43 AM  DISCH DATE:        2021 5:06 PM  RESPONDING  PROVIDER #:        Pao Bingham MD          QUERY TEXT:    Patient admitted with pelvic fxs following a fall, noted to have Osteopenia on   imaging. If possible, please document in progress notes and discharge summary   if you are evaluating and/or treating any of the following: The medical record reflects the following:  Risk Factors: 80 y. o. female with osteopenia and recent fall, Parkinson's  Clinical Indicators: Pelvic fracture  Treatment: Ortho consult, imaging. per Ortho: treat this non-operatively with   a walker and 50% WB left leg for 6 weeks. Options provided:  -- Pathological pelvic fx likely related to osteopenia  -- Traumatic pelvic fracture  -- Other - I will add my own diagnosis  -- Disagree - Not applicable / Not valid  -- Disagree - Clinically unable to determine / Unknown  -- Refer to Clinical Documentation Reviewer    PROVIDER RESPONSE TEXT:    This patient has a pathologic pelvic fracture likely related to osteopenia.     Query created by: Summer Dean on 2021 11:12 AM      Electronically signed by:  Pao Bingham MD 2021 12:43 PM

## 2021-09-03 ENCOUNTER — OFFICE VISIT (OUTPATIENT)
Dept: ORTHOPEDIC SURGERY | Age: 86
End: 2021-09-03
Payer: MEDICARE

## 2021-09-03 VITALS — RESPIRATION RATE: 16 BRPM | BODY MASS INDEX: 18.26 KG/M2 | WEIGHT: 93 LBS | HEIGHT: 60 IN

## 2021-09-03 DIAGNOSIS — S32.502A CLOSED DISPLACED FRACTURE OF LEFT PUBIS, INITIAL ENCOUNTER (HCC): Primary | ICD-10-CM

## 2021-09-03 PROCEDURE — G8427 DOCREV CUR MEDS BY ELIG CLIN: HCPCS | Performed by: ORTHOPAEDIC SURGERY

## 2021-09-03 PROCEDURE — 1090F PRES/ABSN URINE INCON ASSESS: CPT | Performed by: ORTHOPAEDIC SURGERY

## 2021-09-03 PROCEDURE — 1123F ACP DISCUSS/DSCN MKR DOCD: CPT | Performed by: ORTHOPAEDIC SURGERY

## 2021-09-03 PROCEDURE — G8419 CALC BMI OUT NRM PARAM NOF/U: HCPCS | Performed by: ORTHOPAEDIC SURGERY

## 2021-09-03 PROCEDURE — 99213 OFFICE O/P EST LOW 20 MIN: CPT | Performed by: ORTHOPAEDIC SURGERY

## 2021-09-03 PROCEDURE — 1111F DSCHRG MED/CURRENT MED MERGE: CPT | Performed by: ORTHOPAEDIC SURGERY

## 2021-09-03 PROCEDURE — 4040F PNEUMOC VAC/ADMIN/RCVD: CPT | Performed by: ORTHOPAEDIC SURGERY

## 2021-09-03 PROCEDURE — 1036F TOBACCO NON-USER: CPT | Performed by: ORTHOPAEDIC SURGERY

## 2021-09-03 NOTE — PROGRESS NOTES
CHIEF COMPLAINT: Left hip pain, pelvic ring pubic ramus minimally displaced fracture. DATE OF INJURY: 8/9/2021    Ms. Sayda mancia.[email or web address].  female presents today for hospital follow-up evaluation of a left hip pain which occurred when she fell. She is complaining of left hip pain. She rates her pain a 8/10 VAS. This is better with rest and worse with bearing any wt. The pain is sharp and not radiating. She has been 50% weightbearing. She is currently in a rehab center working with physical therapy. Normally she lives alone but has someone with her 24 hours a day. No numbness or tingling sensation. No other complaint. She was seen 1st at [facility], where she was x-rayed and I was consulted . Past Medical History:   Diagnosis Date    Asthma     Hypertension     Osteoarthritis     Parkinson's disease ([address]        Past Surgical History:   Procedure Laterality Date    CATARACT REMOVAL         Social History     Socioeconomic History    Marital status:      Spouse name: Not on file    Number of children: Not on file    Years of education: Not on file    Highest education level: Not on file   Occupational History    Not on file   Tobacco Use    Smoking status: Never Smoker    Smokeless tobacco: Never Used   Vaping Use    Vaping Use: Never used   Substance and Sexual Activity    Alcohol use: No    Drug use: No    Sexual activity: Not Currently   Other Topics Concern    Not on file   Social History Narrative    Not on file     Social Determinants of Health     Financial Resource Strain:     Difficulty of Paying Living Expenses:    Food Insecurity:     Worried About Running Out of Food in the Last Year:     [address] in the Last Year:    Transportation Needs:     Lack of Transportation (Medical):      Lack of Transportation (Non-Medical):    Physical Activity:     Days of Exercise per Week:     Minutes of Exercise per Session:    Stress:     Feeling of Stress :    Social Connections:     Frequency of Communication with Friends and Family:     Frequency of Social Gatherings with Friends and Family:     Attends Temple Services:     Active Member of Clubs or Organizations:     Attends Club or Organization Meetings:     Marital Status:    Intimate Partner Violence:     Fear of Current or Ex-Partner:     Emotionally Abused:     Physically Abused:     Sexually Abused:        Family History   Problem Relation Age of Onset    Other Other         TB       Current Outpatient Medications on File Prior to Visit   Medication Sig Dispense Refill    albuterol sulfate HFA (VENTOLIN HFA) 108 (90 Base) MCG/ACT inhaler Inhale 2 puffs into the lungs every 6 hours as needed for Wheezing      fluticasone-salmeterol (ADVAIR) 250-50 MCG/DOSE AEPB Inhale 1 puff into the lungs every 12 hours      tiotropium (SPIRIVA RESPIMAT) 1.25 MCG/ACT AERS inhaler Inhale 2 puffs into the lungs daily       carbidopa-levodopa (SINEMET CR)  MG per extended release tablet Take 1 tablet by mouth nightly      montelukast (SINGULAIR) 10 MG tablet Take 10 mg by mouth nightly      QUEtiapine (SEROQUEL) 25 MG tablet Take 75 mg by mouth nightly      carbidopa-levodopa (SINEMET)  MG per tablet Take 2 tablets by mouth 2 times daily Give at 0700 and 1200      carbidopa-levodopa (SINEMET)  MG per tablet Take 1.5 tablets by mouth Daily with supper Give at 1700      losartan (COZAAR) 25 MG tablet Take 25 mg by mouth daily      dicyclomine (BENTYL) 20 MG tablet Take 20 mg by mouth 2 times daily      vitamin D (CHOLECALCIFEROL) 25 MCG (1000 UT) TABS tablet Take 1,000 Units by mouth daily      vitamin B-12 (CYANOCOBALAMIN) 100 MCG tablet Take 100 mcg by mouth daily      polyethylene glycol (GLYCOLAX) 17 g packet Take 17 g by mouth nightly as needed for Constipation       No current facility-administered medications on file prior to visit.        Pertinent items are noted in HPI  Review of systems reviewed from Patient History Form dated on 9/3/2021 from the nursing home and available in the patient's chart under the Media tab. No change noted. PHYSICAL EXAMINATION:  Ms. Zenaida Cole is a very pleasant 80 y.o.  female who presents today in no acute distress, awake, alert, and oriented. She is well dressed, nourished and  groomed. Patient with normal affect. Height is  5' (1.524 m), weight is 93 lb (42.2 kg), Body mass index is 18.16 kg/m². Resting respiratory rate is 16. Examination of the gait, showed that the patient walks with a limp using a walker, PWB left leg . Examination of both lower extrimiteis showing a decreased range of motion of the left hip compare to the other side because of pain. There is no swelling that can be seen, or ecchymosis over the left hip. She  has intact sensation and good pedal pulses. She has significant tenderness on deep palpation over the left pubic ramus. IMAGING: Clayburn Petersen were reviewed, dated today in the office,  AP pelvis and 2 views of the left hip, and showed a minimally displaced pubic ramus fracture. IMPRESSION: Left pelvic ring pubic ramus minimally displaced fracture. PLAN: I discussed with Mirian Fong that the overall alignment of this fracture is good and that we can try to treat this non-operatively with a walker and WBAT. We discussed the risk of nonunion and  malunion. We will see her  back in 4  weeks at which time we will get a new xray of the pelvis.         Zack Weller MD

## 2021-10-05 ENCOUNTER — OFFICE VISIT (OUTPATIENT)
Dept: ORTHOPEDIC SURGERY | Age: 86
End: 2021-10-05
Payer: MEDICARE

## 2021-10-05 VITALS — HEIGHT: 60 IN | WEIGHT: 93 LBS | BODY MASS INDEX: 18.26 KG/M2

## 2021-10-05 DIAGNOSIS — S32.502A CLOSED DISPLACED FRACTURE OF LEFT PUBIS, INITIAL ENCOUNTER (HCC): Primary | ICD-10-CM

## 2021-10-05 PROCEDURE — G8484 FLU IMMUNIZE NO ADMIN: HCPCS | Performed by: ORTHOPAEDIC SURGERY

## 2021-10-05 PROCEDURE — G8419 CALC BMI OUT NRM PARAM NOF/U: HCPCS | Performed by: ORTHOPAEDIC SURGERY

## 2021-10-05 PROCEDURE — G8427 DOCREV CUR MEDS BY ELIG CLIN: HCPCS | Performed by: ORTHOPAEDIC SURGERY

## 2021-10-05 PROCEDURE — 1090F PRES/ABSN URINE INCON ASSESS: CPT | Performed by: ORTHOPAEDIC SURGERY

## 2021-10-05 PROCEDURE — 1123F ACP DISCUSS/DSCN MKR DOCD: CPT | Performed by: ORTHOPAEDIC SURGERY

## 2021-10-05 PROCEDURE — 99213 OFFICE O/P EST LOW 20 MIN: CPT | Performed by: ORTHOPAEDIC SURGERY

## 2021-10-05 PROCEDURE — 4040F PNEUMOC VAC/ADMIN/RCVD: CPT | Performed by: ORTHOPAEDIC SURGERY

## 2021-10-05 PROCEDURE — 1036F TOBACCO NON-USER: CPT | Performed by: ORTHOPAEDIC SURGERY

## 2021-10-06 PROBLEM — S32.502A CLOSED DISPLACED FRACTURE OF LEFT PUBIS (HCC): Status: ACTIVE | Noted: 2021-10-06

## 2021-10-07 NOTE — PROGRESS NOTES
CHIEF COMPLAINT: Left hip pain, pelvic ring pubic ramus minimally displaced fracture. DATE OF INJURY: 8/9/2021    Ms. Hugo Carr 80 y.o.  female presents today for follow-up evaluation of a left hip pain which occurred when she fell. She reported no pain. She rates her pain a 0/10 VAS. No numbness or tingling sensation. No other complaint. She was seen 1st at [facility], where she was x-rayed and I was consulted . Past Medical History:   Diagnosis Date    Asthma     Hypertension     Low TSH level     Osteoarthritis     Parkinson's disease ([address]        Past Surgical History:   Procedure Laterality Date    CATARACT REMOVAL         Social History     Socioeconomic History    Marital status:      Spouse name: Not on file    Number of children: Not on file    Years of education: Not on file    Highest education level: Not on file   Occupational History    Not on file   Tobacco Use    Smoking status: Never Smoker    Smokeless tobacco: Never Used   Vaping Use    Vaping Use: Never used   Substance and Sexual Activity    Alcohol use: No    Drug use: No    Sexual activity: Not Currently   Other Topics Concern    Not on file   Social History Narrative    Not on file     Social Determinants of Health     Financial Resource Strain:     Difficulty of Paying Living Expenses:    Food Insecurity:     Worried About Running Out of Food in the Last Year:     [address] in the Last Year:    Transportation Needs:     Lack of Transportation (Medical):      Lack of Transportation (Non-Medical):    Physical Activity:     Days of Exercise per Week:     Minutes of Exercise per Session:    Stress:     Feeling of Stress :    Social Connections:     Frequency of Communication with Friends and Family:     Frequency of Social Gatherings with Friends and Family:     Attends Jain Services:     Active Member of Clubs or Organizations:     Attends Club or Organization very pleasant 89 y.o.  female who presents today in no acute distress, awake, alert, and oriented. She is well dressed, nourished and  groomed. Patient with normal affect. Height is  5' (1.524 m), weight is 93 lb (42.2 kg), Body mass index is 18.16 kg/m². Resting respiratory rate is 16. Examination of the gait, showed that the patient walks with no limp using a walker, WB left leg . Examination of both lower extrimiteis showing a good range of motion of the left hip compare to the other side because of pain. There is no swelling that can be seen, or ecchymosis over the left hip. She  has intact sensation and good pedal pulses. She has no tenderness on deep palpation over the left pubic ramus. IMAGING: Tenisha Martin were reviewed, dated today in the office,  AP pelvis, and showed a minimally displaced pubic ramus fracture. IMPRESSION: Left pelvic ring pubic ramus minimally displaced fracture. PLAN: I discussed with Leonora Eckert that the overall alignment of this fracture is good and that we can try to treat this non-operatively with a walker and WBAT. Quad exercises. NSAIDs OTC. We discussed the risk of nonunion and  malunion. We will see her  back in 6 weeks at which time we will get a new xray of the pelvis.         Jhoana Singletary MD